# Patient Record
Sex: FEMALE | Race: WHITE | Employment: FULL TIME | URBAN - METROPOLITAN AREA
[De-identification: names, ages, dates, MRNs, and addresses within clinical notes are randomized per-mention and may not be internally consistent; named-entity substitution may affect disease eponyms.]

---

## 2017-06-12 ENCOUNTER — OFFICE VISIT (OUTPATIENT)
Dept: INTERNAL MEDICINE CLINIC | Age: 62
End: 2017-06-12

## 2017-06-12 VITALS
WEIGHT: 156.6 LBS | SYSTOLIC BLOOD PRESSURE: 112 MMHG | OXYGEN SATURATION: 98 % | BODY MASS INDEX: 23.73 KG/M2 | DIASTOLIC BLOOD PRESSURE: 70 MMHG | TEMPERATURE: 98.5 F | HEIGHT: 68 IN | RESPIRATION RATE: 16 BRPM | HEART RATE: 71 BPM

## 2017-06-12 DIAGNOSIS — Z71.84 TRAVEL ADVICE ENCOUNTER: ICD-10-CM

## 2017-06-12 DIAGNOSIS — E03.4 HYPOTHYROIDISM DUE TO ACQUIRED ATROPHY OF THYROID: ICD-10-CM

## 2017-06-12 DIAGNOSIS — Z00.00 WELL WOMAN EXAM (NO GYNECOLOGICAL EXAM): Primary | ICD-10-CM

## 2017-06-12 DIAGNOSIS — F41.9 ANXIETY: ICD-10-CM

## 2017-06-12 DIAGNOSIS — Z78.0 POSTMENOPAUSE: ICD-10-CM

## 2017-06-12 RX ORDER — LEVOTHYROXINE SODIUM 88 UG/1
TABLET ORAL
Qty: 30 TAB | Refills: 11 | Status: SHIPPED | OUTPATIENT
Start: 2017-06-12 | End: 2018-06-25 | Stop reason: SDUPTHER

## 2017-06-12 RX ORDER — ALPRAZOLAM 1 MG/1
1 TABLET ORAL
Qty: 25 TAB | Refills: 0 | Status: SHIPPED | OUTPATIENT
Start: 2017-06-12 | End: 2018-01-10 | Stop reason: SDUPTHER

## 2017-06-12 NOTE — MR AVS SNAPSHOT
Visit Information Date & Time Provider Department Dept. Phone Encounter #  
 6/12/2017 10:30 AM Ryan Womack MD [facility] [address] [id number] Upcoming Health Maintenance Date Due Hepatitis C Screening 1955 ZOSTER VACCINE AGE 60> 5/6/2016 INFLUENZA AGE 9 TO ADULT 8/1/2017 BREAST CANCER SCRN MAMMOGRAM 8/25/2018 PAP AKA CERVICAL CYTOLOGY 6/2/2019 DTaP/Tdap/Td series (2 - Td) 9/21/2019 COLONOSCOPY 3/15/2023 Allergies as of 6/12/2017  Review Complete On: 6/12/2017 By: Ryan Womack MD  
  
 Severity Noted Reaction Type Reactions Aspirin  05/26/2009    Anaphylaxis Azithromycin  12/11/2015    Hives Codeine  05/26/2009    Hives Coumadin [Warfarin]  05/26/2009    Rash Red Dye  04/07/2011   Systemic Swelling Red dye # 5 - lip swelling Sulfa (Sulfonamide Antibiotics)  05/26/2009    Hives Current Immunizations  Reviewed on 6/12/2017 Name Date TDAP Vaccine 9/21/2009  2:42 PM  
  
 Reviewed by Ryan Womack MD on 6/12/2017 at 10:51 AM  
You Were Diagnosed With   
  
 Codes Comments Well woman exam (no gynecological exam)    -  Primary ICD-10-CM: Z00.00 ICD-9-CM: V70.0 Hypothyroidism due to acquired atrophy of thyroid     ICD-10-CM: E03.4 ICD-9-CM: 244.8, 246.8 Postmenopause     ICD-10-CM: Z78.0 ICD-9-CM: V49.81 Anxiety     ICD-10-CM: F41.9 ICD-9-CM: 300.00 Travel advice encounter     ICD-10-CM: Z71.89 ICD-9-CM: V65.49 Vitals BP Pulse Temp Resp Height(growth percentile) Weight(growth percentile) 112/70 (BP 1 Location: Left arm, BP Patient Position: Sitting) 71 98.5 °F (36.9 °C) (Oral) 16 5' 8\" (1.727 m) 156 lb 9.6 oz (71 kg) LMP SpO2 BMI OB Status Smoking Status 02/05/2007 98% 23.81 kg/m2 Postmenopausal Never Smoker Vitals History BMI and BSA Data Body Mass Index Body Surface Area  
 23.81 kg/m 2 1.85 m 2 Preferred Pharmacy Pharmacy Name Phone [address], [address] [facility] [address] [phone] Your Updated Medication List  
  
   
This list is accurate as of: 6/12/17 11:04 AM.  Always use your most recent med list.  
  
  
  
  
 ALPRAZolam 1 mg tablet Commonly known as:  Syed Cellar Take 1 Tab by mouth nightly as needed for Anxiety. Max Daily Amount: 1 mg.  
  
 conjugated estrogens 0.625 mg/gram vaginal cream  
Commonly known as:  PREMARIN  
INSERT 1 GRAM VAGINALLY TWICE WEEKLY AS DIRECTED  
  
 fexofenadine 180 mg tablet Commonly known as:  Hoang Vicente Take 1 Tab by mouth daily for 360 days. SYNTHROID 88 mcg tablet Generic drug:  levothyroxine TAKE 1 TABLET BY MOUTH EVERY DAY Prescriptions Printed Refills ALPRAZolam (XANAX) 1 mg tablet 0 Sig: Take 1 Tab by mouth nightly as needed for Anxiety. Max Daily Amount: 1 mg. Class: Print Route: Oral  
  
Prescriptions Sent to Pharmacy Refills  
 conjugated estrogens (PREMARIN) 0.625 mg/gram vaginal cream 5 Sig: INSERT 1 GRAM VAGINALLY TWICE WEEKLY AS DIRECTED Class: Normal  
 Pharmacy: [address] Ph #: [phone] SYNTHROID 88 mcg tablet 11 Sig: TAKE 1 TABLET BY MOUTH EVERY DAY Class: Normal  
 Pharmacy: [address] [facility] Ph #: [phone] We Performed the Following CBC WITH AUTOMATED DIFF [88652 CPT(R)] HEPATITIS C AB [18625 CPT(R)] LIPID PANEL [90016 CPT(R)] METABOLIC PANEL, COMPREHENSIVE [09608 CPT(R)] T4, FREE [id number] CPT(R)] TSH 3RD GENERATION [07187 CPT(R)] VITAMIN D, 25 HYDROXY [id number] CPT(R)] [facility]! Dear Fabien Gan: 
Thank you for requesting a [employer] account.   Our records indicate that you already have an active Daily De[employer] account. You can access your account anytime at [email or web address] Did you know that you can access your hospital and ER discharge instructions at any time in Daily De[employer]? You can also review all of your test results from your hospital stay or ER visit. Additional Information If you have questions, please visit the Frequently Asked Questions section of the Daily De[employer] website at [email or web address] Remember, Daily [employer] is NOT to be used for urgent needs. For medical emergencies, dial 911. Now available from your iPhone and Android! Please provide this summary of care documentation to your next provider. Your primary care clinician is listed as Cece Sheppard. If you have any questions after today's visit, please call [phone].

## 2017-06-13 LAB
25(OH)D3+25(OH)D2 SERPL-MCNC: 36.4 NG/ML (ref 30–100)
ALBUMIN SERPL-MCNC: 4.1 G/DL (ref 3.6–4.8)
ALBUMIN/GLOB SERPL: 1.5 {RATIO} (ref 1.2–2.2)
ALP SERPL-CCNC: 95 IU/L (ref 39–117)
ALT SERPL-CCNC: 19 IU/L (ref 0–32)
AST SERPL-CCNC: 26 IU/L (ref 0–40)
BASOPHILS # BLD AUTO: 0 X10E3/UL (ref 0–0.2)
BASOPHILS NFR BLD AUTO: 0 %
BILIRUB SERPL-MCNC: 0.6 MG/DL (ref 0–1.2)
BUN SERPL-MCNC: 15 MG/DL (ref 8–27)
BUN/CREAT SERPL: 20 (ref 12–28)
CALCIUM SERPL-MCNC: 9.5 MG/DL (ref 8.7–10.3)
CHLORIDE SERPL-SCNC: 101 MMOL/L (ref 96–106)
CHOLEST SERPL-MCNC: 176 MG/DL (ref 100–199)
CO2 SERPL-SCNC: 24 MMOL/L (ref 18–29)
CREAT SERPL-MCNC: 0.76 MG/DL (ref 0.57–1)
EOSINOPHIL # BLD AUTO: 0.5 X10E3/UL (ref 0–0.4)
EOSINOPHIL NFR BLD AUTO: 8 %
ERYTHROCYTE [DISTWIDTH] IN BLOOD BY AUTOMATED COUNT: 13.2 % (ref 12.3–15.4)
GLOBULIN SER CALC-MCNC: 2.7 G/DL (ref 1.5–4.5)
GLUCOSE SERPL-MCNC: 93 MG/DL (ref 65–99)
HCT VFR BLD AUTO: 38.7 % (ref 34–46.6)
HCV AB S/CO SERPL IA: <0.1 S/CO RATIO (ref 0–0.9)
HDLC SERPL-MCNC: 58 MG/DL
HGB BLD-MCNC: 12.7 G/DL (ref 11.1–15.9)
IMM GRANULOCYTES # BLD: 0 X10E3/UL (ref 0–0.1)
IMM GRANULOCYTES NFR BLD: 0 %
INTERPRETATION, 910389: NORMAL
LDLC SERPL CALC-MCNC: 106 MG/DL (ref 0–99)
LYMPHOCYTES # BLD AUTO: 1.5 X10E3/UL (ref 0.7–3.1)
LYMPHOCYTES NFR BLD AUTO: 27 %
MCH RBC QN AUTO: 30.7 PG (ref 26.6–33)
MCHC RBC AUTO-ENTMCNC: 32.8 G/DL (ref 31.5–35.7)
MCV RBC AUTO: 94 FL (ref 79–97)
MONOCYTES # BLD AUTO: 0.5 X10E3/UL (ref 0.1–0.9)
MONOCYTES NFR BLD AUTO: 8 %
NEUTROPHILS # BLD AUTO: 3.1 X10E3/UL (ref 1.4–7)
NEUTROPHILS NFR BLD AUTO: 57 %
PLATELET # BLD AUTO: 230 X10E3/UL (ref 150–379)
POTASSIUM SERPL-SCNC: 4.1 MMOL/L (ref 3.5–5.2)
PROT SERPL-MCNC: 6.8 G/DL (ref 6–8.5)
RBC # BLD AUTO: 4.14 X10E6/UL (ref 3.77–5.28)
SODIUM SERPL-SCNC: 141 MMOL/L (ref 134–144)
T4 FREE SERPL-MCNC: 1.4 NG/DL (ref 0.82–1.77)
TRIGL SERPL-MCNC: 59 MG/DL (ref 0–149)
TSH SERPL DL<=0.005 MIU/L-ACNC: 1.91 UIU/ML (ref 0.45–4.5)
VLDLC SERPL CALC-MCNC: 12 MG/DL (ref 5–40)
WBC # BLD AUTO: 5.6 X10E3/UL (ref 3.4–10.8)

## 2017-08-01 ENCOUNTER — TELEPHONE (OUTPATIENT)
Dept: INTERNAL MEDICINE CLINIC | Age: 62
End: 2017-08-01

## 2018-01-10 DIAGNOSIS — F41.9 ANXIETY: ICD-10-CM

## 2018-01-10 RX ORDER — ALPRAZOLAM 1 MG/1
1 TABLET ORAL
Qty: 25 TAB | Refills: 0 | OUTPATIENT
Start: 2018-01-10 | End: 2018-06-21 | Stop reason: SDUPTHER

## 2018-01-10 NOTE — TELEPHONE ENCOUNTER
----- Message from Guanaco Hassan sent at 1/10/2018  9:23 AM EST -----  Regarding: Dr. Mayer   Patient is requesting a prescription for Xanax be sent to the [place] at [phone]. The patient's number is [phone].

## 2018-06-11 ENCOUNTER — OFFICE VISIT (OUTPATIENT)
Dept: INTERNAL MEDICINE CLINIC | Age: 63
End: 2018-06-11

## 2018-06-11 VITALS
TEMPERATURE: 97.7 F | HEIGHT: 68 IN | OXYGEN SATURATION: 98 % | DIASTOLIC BLOOD PRESSURE: 82 MMHG | BODY MASS INDEX: 22.73 KG/M2 | HEART RATE: 74 BPM | SYSTOLIC BLOOD PRESSURE: 126 MMHG | RESPIRATION RATE: 12 BRPM | WEIGHT: 150 LBS

## 2018-06-21 DIAGNOSIS — F41.9 ANXIETY: ICD-10-CM

## 2018-06-21 DIAGNOSIS — Z78.0 POSTMENOPAUSE: ICD-10-CM

## 2018-06-21 RX ORDER — ALPRAZOLAM 1 MG/1
TABLET ORAL
Qty: 25 TAB | Refills: 0 | OUTPATIENT
Start: 2018-06-21 | End: 2018-10-01 | Stop reason: SDUPTHER

## 2018-06-21 RX ORDER — CONJUGATED ESTROGENS 0.62 MG/G
CREAM VAGINAL
Qty: 30 G | Refills: 0 | Status: SHIPPED | OUTPATIENT
Start: 2018-06-21 | End: 2018-07-12 | Stop reason: SDUPTHER

## 2018-06-25 DIAGNOSIS — E03.4 HYPOTHYROIDISM DUE TO ACQUIRED ATROPHY OF THYROID: ICD-10-CM

## 2018-06-25 RX ORDER — LEVOTHYROXINE SODIUM 88 UG/1
TABLET ORAL
Qty: 30 TAB | Refills: 0 | Status: SHIPPED | OUTPATIENT
Start: 2018-06-25 | End: 2018-07-12 | Stop reason: SDUPTHER

## 2018-07-12 ENCOUNTER — OFFICE VISIT (OUTPATIENT)
Dept: INTERNAL MEDICINE CLINIC | Age: 63
End: 2018-07-12

## 2018-07-12 VITALS
WEIGHT: 150.2 LBS | HEIGHT: 68 IN | TEMPERATURE: 97.8 F | BODY MASS INDEX: 22.76 KG/M2 | HEART RATE: 67 BPM | RESPIRATION RATE: 12 BRPM | DIASTOLIC BLOOD PRESSURE: 71 MMHG | OXYGEN SATURATION: 98 % | SYSTOLIC BLOOD PRESSURE: 124 MMHG

## 2018-07-12 DIAGNOSIS — Z78.0 POSTMENOPAUSE: ICD-10-CM

## 2018-07-12 DIAGNOSIS — Z01.419 WELL WOMAN EXAM WITH ROUTINE GYNECOLOGICAL EXAM: Primary | ICD-10-CM

## 2018-07-12 DIAGNOSIS — E03.4 HYPOTHYROIDISM DUE TO ACQUIRED ATROPHY OF THYROID: ICD-10-CM

## 2018-07-12 DIAGNOSIS — M85.89 OSTEOPENIA OF MULTIPLE SITES: ICD-10-CM

## 2018-07-12 DIAGNOSIS — Z23 ENCOUNTER FOR IMMUNIZATION: ICD-10-CM

## 2018-07-12 RX ORDER — LEVOTHYROXINE SODIUM 88 UG/1
TABLET ORAL
Qty: 90 TAB | Refills: 1 | Status: SHIPPED | OUTPATIENT
Start: 2018-07-12 | End: 2019-01-13 | Stop reason: SDUPTHER

## 2018-07-12 NOTE — ASSESSMENT & PLAN NOTE
Lab Results   Component Value Date/Time    WBC 5.6 06/12/2017 11:39 AM    HGB 12.7 06/12/2017 11:39 AM    HCT 38.7 06/12/2017 11:39 AM    PLATELET 718 82/38/2007 11:39 AM    Creatinine 0.76 06/12/2017 11:39 AM    BUN 15 06/12/2017 11:39 AM    Potassium 4.1 06/12/2017 11:39 AM

## 2018-07-12 NOTE — MR AVS SNAPSHOT
[address] [facility], [place] 
 
 
 [address] [place] [address] [facility] 
[phone] Patient: Oumou Denton MRN: [id number] BPX:[birth year] Visit Information Date & Time Provider Department Dept. Phone Encounter #  
 7/12/2018  9:00 AM Silva Ortez NP [facility] [address] [id number] Upcoming Health Maintenance Date Due  
 BREAST CANCER SCRN MAMMOGRAM 8/25/2018 Influenza Age 5 to Adult 8/1/2018 PAP AKA CERVICAL CYTOLOGY 6/2/2019 DTaP/Tdap/Td series (2 - Td) 9/21/2019 COLONOSCOPY 3/15/2023 Allergies as of 7/12/2018  Review Complete On: 7/12/2018 By: Silva Ortez NP Severity Noted Reaction Type Reactions Aspirin  05/26/2009    Anaphylaxis Azithromycin  12/11/2015    Hives Codeine  05/26/2009    Hives Coumadin [Warfarin]  05/26/2009    Rash Red Dye  04/07/2011   Systemic Swelling Red dye # 5 - lip swelling Sulfa (Sulfonamide Antibiotics)  05/26/2009    Hives Current Immunizations  Reviewed on 6/12/2017 Name Date Pneumococcal Conjugate (PCV-13) 6/12/2017 TDAP Vaccine 9/21/2009  2:42 PM  
 Zoster Vaccine, Live 9/1/2016 Not reviewed this visit You Were Diagnosed With   
  
 Codes Comments Well woman exam with routine gynecological exam    -  Primary ICD-10-CM: W78.390 ICD-9-CM: V72.31 Postmenopause     ICD-10-CM: Z78.0 ICD-9-CM: V49.81 Osteopenia of multiple sites     ICD-10-CM: M85.89 ICD-9-CM: 733.90 Hypothyroidism due to acquired atrophy of thyroid     ICD-10-CM: E03.4 ICD-9-CM: 244.8, 246.8 Encounter for immunization     ICD-10-CM: D61 ICD-9-CM: V03.89 Vitals BP Pulse Temp Resp Height(growth percentile) Weight(growth percentile) 124/71 (BP 1 Location: Left arm, BP Patient Position: Sitting) 67 97.8 °F (36.6 °C) (Oral) 12 5' 8\" (1.727 m) 150 lb 3.2 oz (68.1 kg) LMP SpO2 BMI OB Status Smoking Status 2007 98% 22.84 kg/m2 Postmenopausal Never Smoker BMI and BSA Data Body Mass Index Body Surface Area  
 22.84 kg/m 2 1.81 m 2 Preferred Pharmacy Pharmacy Name Phone [address], [address] [facility] [address] Janelle Twan [phone] Your Updated Medication List  
  
   
This list is accurate as of 18  9:54 AM.  Always use your most recent med list.  
  
  
  
  
 ALPRAZolam 1 mg tablet Commonly known as:  XANAX  
TAKE 1 TABLET BY MOUTH EVERY EVENING AS NEEDED ANXIETY  
  
 conjugated estrogens 0.625 mg/gram vaginal cream  
Commonly known as:  PREMARIN  
INSERT 1 GRAM VAGINALLY 2 TIMES A WEEK AS DIRECTED  
  
 fexofenadine 180 mg tablet Commonly known as:  Lesly Chock Take 1 Tab by mouth daily for 360 days. SYNTHROID 88 mcg tablet Generic drug:  levothyroxine TAKE 1 TABLET BY MOUTH EVERY DAY  
  
 varicella-zoster recombinant (PF) 50 mcg/0.5 mL Susr injection Commonly known as:  SHINGRIX (PF)  
0.5 mL by IntraMUSCular route once for 1 dose. Repeat in 2-6 months with 2nd vaccine Prescriptions Printed Refills  
 varicella-zoster recombinant, PF, (SHINGRIX, PF,) 50 mcg/0.5 mL susr injection 1 Si.5 mL by IntraMUSCular route once for 1 dose. Repeat in 2-6 months with 2nd vaccine Class: Print Route: IntraMUSCular Prescriptions Sent to Pharmacy Refills  
 conjugated estrogens (PREMARIN) 0.625 mg/gram vaginal cream 3 Sig: INSERT 1 GRAM VAGINALLY 2 TIMES A WEEK AS DIRECTED Class: Normal  
 Pharmacy: [address] Ph #: [phone] SYNTHROID 88 mcg tablet 1 Sig: TAKE 1 TABLET BY MOUTH EVERY DAY Class: Normal  
 Pharmacy: [facility] [address] [place] [address] [address]la Ph #: [phone] We Performed the Following CBC WITH AUTOMATED DIFF [07891 CPT(R)] LIPID PANEL [82120 CPT(R)] METABOLIC PANEL, COMPREHENSIVE [37991 CPT(R)] OCCULT BLOOD, IMMUNOASSAY (FIT) [id number] CPT(R)] T4, FREE [id number] CPT(R)] TSH 3RD GENERATION [67855 CPT(R)] URINALYSIS W/ RFLX MICROSCOPIC [29853 CPT(R)] VITAMIN D, 25 HYDROXY [id number] CPT(R)] To-Do List   
 07/12/2018 Imaging:  DEXA BONE DENSITY STUDY AXIAL   
  
 07/12/2018 Imaging:  ROSANNA 3D SHAWANDA W MAMMO BI SCREENING INCL CAD Referral Information Referral ID Referred By Referred To  
  
 [id number] Kermit Stokes Not Available Visits Status Start Date End Date 1 New Request 7/12/18 7/12/19 If your referral has a status of pending review or denied, additional information will be sent to support the outcome of this decision. Patient Instructions Well Visit, Women 48 to 72: Care Instructions Your Care Instructions Physical exams can help you stay healthy. Your doctor has checked your overall health and may have suggested ways to take good care of yourself. He or she also may have recommended tests. At home, you can help prevent illness with healthy eating, regular exercise, and other steps. Follow-up care is a key part of your treatment and safety. Be sure to make and go to all appointments, and call your doctor if you are having problems. It's also a good idea to know your test results and keep a list of the medicines you take. How can you care for yourself at home? · Reach and stay at a healthy weight. This will lower your risk for many problems, such as obesity, diabetes, heart disease, and high blood pressure. · Get at least 30 minutes of exercise on most days of the week. Walking is a good choice. You also may want to do other activities, such as running, swimming, cycling, or playing tennis or team sports. · Do not smoke. Smoking can make health problems worse.  If you need help quitting, talk to your doctor about stop-smoking programs and medicines. These can increase your chances of quitting for good. · Protect your skin from too much sun. When you're outdoors from 10 a.m. to 4 p.m., stay in the shade or cover up with clothing and a hat with a wide brim. Wear sunglasses that block UV rays. Even when it's cloudy, put broad-spectrum sunscreen (SPF 30 or higher) on any exposed skin. · See a dentist one or two times a year for checkups and to have your teeth cleaned. · Wear a seat belt in the car. · Limit alcohol to 1 drink a day. Too much alcohol can cause health problems. Follow your doctor's advice about when to have certain tests. These tests can spot problems early. · Cholesterol. Your doctor will tell you how often to have this done based on your age, family history, or other things that can increase your risk for heart attack and stroke. · Blood pressure. Have your blood pressure checked during a routine doctor visit. Your doctor will tell you how often to check your blood pressure based on your age, your blood pressure results, and other factors. · Mammogram. Ask your doctor how often you should have a mammogram, which is an X-ray of your breasts. A mammogram can spot breast cancer before it can be felt and when it is easiest to treat. · Pap test and pelvic exam. Ask your doctor how often you should have a Pap test. You may not need to have a Pap test as often as you used to. · Vision. Have your eyes checked every year or two or as often as your doctor suggests. Some experts recommend that you have yearly exams for glaucoma and other age-related eye problems starting at age 48. · Hearing. Tell your doctor if you notice any change in your hearing. You can have tests to find out how well you hear. · Diabetes. Ask your doctor whether you should have tests for diabetes. · Colon cancer. You should begin tests for colon cancer at age 48.  You may have one of several tests. Your doctor will tell you how often to have tests based on your age and risk. Risks include whether you already had a precancerous polyp removed from your colon or whether your parents, sisters and brothers, or children have had colon cancer. · Thyroid disease. Talk to your doctor about whether to have your thyroid checked as part of a regular physical exam. Women have an increased chance of a thyroid problem. · Osteoporosis. You should begin tests for bone density at age 72. If you are younger than 72, ask your doctor whether you have factors that may increase your risk for this disease. You may want to have this test before age 72. · Heart attack and stroke risk. At least every 4 to 6 years, you should have your risk for heart attack and stroke assessed. Your doctor uses factors such as your age, blood pressure, cholesterol, and whether you smoke or have diabetes to show what your risk for a heart attack or stroke is over the next 10 years. When should you call for help? Watch closely for changes in your health, and be sure to contact your doctor if you have any problems or symptoms that concern you. Where can you learn more? Go to http://ewa-adam.info/. Enter C979 in the search box to learn more about \"Well Visit, Women 50 to 72: Care Instructions. \" Current as of: May 16, 2017 Content Version: 11.7 © 8338-9333 [employer]. Care instructions adapted under license by [employer] (which disclaims liability or warranty for this information). If you have questions about a medical condition or this instruction, always ask your healthcare professional. [place] [address] any warranty or liability for your use of this information. Introducing [facility] & HEALTH SERVICES! Dear Ladan Chris: 
Thank you for requesting a ugichem account. Our records indicate that you already have an active ugichem account.   You can access your account anytime at [email or web address] Did you know that you can access your hospital and ER discharge instructions at any time in [employer]? You can also review all of your test results from your hospital stay or ER visit. Additional Information If you have questions, please visit the Frequently Asked Questions section of the [employer] website at [email or web address] Remember, [employer] is NOT to be used for urgent needs. For medical emergencies, dial 911. Now available from your iPhone and Android! Please provide this summary of care documentation to your next provider. Your primary care clinician is listed as Cece 68. If you have any questions after today's visit, please call [phone].

## 2018-07-12 NOTE — PROGRESS NOTES
HISTORY OF PRESENT ILLNESS  Oumou Denton is a 59 y.o. female. HPI  Oumou Denton is here for complete health maintenance physical exam and screening. she does have other concerns. Pap/pelvic exam not indicated at this time according to current screening guidelines    Health maintenance hx includes:  Exercise: very active. Form of exercise: plays tennis 5 days per week, walking, cycling, swimming   Diet: generally follows a low fat low cholesterol diet; has been trying to cut down on her sugar intake    Cancer screening:    Colon cancer screening:  Last Colonoscopy: 2013 and   was normal   Breast cancer screening: last mammogram 2016 and   was normal   Cervical cancer screening: last PAP/Pelvic exam: 2016   and was normal.   Osteoporosis screening:  Last BMD:  2014 and revealed - Osteopenia     SUBJECTIVE: Oumou Denton is a 59 y.o. female here for follow up of hypothyroidism. Lab Results   Component Value Date/Time    TSH 1.910 06/12/2017 11:39 AM     Thyroid ROS: denies fatigue, weight changes, heat/cold intolerance, bowel/skin changes or CVS symptoms. She is due to have her levels checked. Bone mineral density in 2014 showed osteopenia; she eats dairy products for calcium but has not been taking any Vitamin D supplements; spends time in sun. Takes Xanax sporadically when she is unable to sleep and was recently filled June 2018.       Lab Results   Component Value Date/Time    Cholesterol, total 176 06/12/2017 11:39 AM    HDL Cholesterol 58 06/12/2017 11:39 AM    LDL, calculated 106 (H) 06/12/2017 11:39 AM    VLDL, calculated 12 06/12/2017 11:39 AM    Triglyceride 59 06/12/2017 11:39 AM    CHOL/HDL Ratio 3.2 10/08/2010 01:04 PM         Lab Results   Component Value Date/Time    Glucose 93 06/12/2017 11:39 AM       Immunizations:     Immunization History   Administered Date(s) Administered    Pneumococcal Conjugate (PCV-13) 06/12/2017    TDAP Vaccine 09/21/2009    Zoster Vaccine, Live 09/01/2016 Immunization status: given printed Rx for Shingrix vaccine. Social History     Social History    Marital status:      Spouse name: N/A    Number of children: N/A    Years of education: N/A     Occupational History    Not on file. Social History Main Topics    Smoking status: Never Smoker    Smokeless tobacco: Never Used    Alcohol use 0.5 oz/week     1 Cans of beer per week      Comment: rarely    Drug use: No    Sexual activity: Yes     Partners: Male     Other Topics Concern    Not on file     Social History Narrative     Past Surgical History:   Procedure Laterality Date    HX COLONOSCOPY  3/2013    HX HEENT      wisdom teeth extraction    HX ORTHOPAEDIC  1/2016    right 3rd finger trigger release    HX TONSILLECTOMY      HX TUBAL LIGATION       Family History   Problem Relation Age of Onset    Cancer Mother      breast ca 61, copd age 67    Lung Disease Mother     Heart Failure Father      mi age 62    Heart Disease Father     Hypertension Father     Cancer Sister      breast ca 50    Thyroid Disease Sister     Thyroid Disease Brother     Cancer Sister      thyroid cancer    Diabetes Sister      Type 1 DM    No Known Problems Son     No Known Problems Daughter     No Known Problems Son      Current Outpatient Prescriptions on File Prior to Visit   Medication Sig Dispense Refill    ALPRAZolam (XANAX) 1 mg tablet TAKE 1 TABLET BY MOUTH EVERY EVENING AS NEEDED ANXIETY 25 Tab 0    fexofenadine (ALLEGRA) 180 mg tablet Take 1 Tab by mouth daily for 360 days. (Patient taking differently: Take 180 mg by mouth as needed.) 30 Tab 5     No current facility-administered medications on file prior to visit. .  Review of Systems   Constitutional: Negative for chills, fever and malaise/fatigue. HENT: Negative for congestion, ear pain, sinus pain and sore throat. Eyes: Negative for blurred vision, double vision and photophobia.    Respiratory: Negative for cough and shortness of breath. Cardiovascular: Negative for chest pain, palpitations and leg swelling. Gastrointestinal: Negative for abdominal pain, blood in stool, constipation, diarrhea, nausea and vomiting. Genitourinary: Negative for dysuria, frequency, hematuria and urgency. Musculoskeletal: Negative for joint pain and myalgias. Skin: Negative for rash. Neurological: Negative for dizziness and headaches. Psychiatric/Behavioral: Negative for depression. The patient has insomnia (occasionally when traveling). /71 (BP 1 Location: Left arm, BP Patient Position: Sitting)  Pulse 67  Temp 97.8 °F (36.6 °C) (Oral)   Resp 12  Ht 5' 8\" (1.727 m)  Wt 150 lb 3.2 oz (68.1 kg)  LMP 02/05/2007  SpO2 98%  BMI 22.84 kg/m2  Physical Exam   Constitutional: She is oriented to person, place, and time. She appears well-developed and well-nourished. HENT:   Head: Normocephalic and atraumatic. Right Ear: External ear normal.   Left Ear: External ear normal.   Nose: Nose normal.   Mouth/Throat: Oropharynx is clear and moist.   Eyes: Conjunctivae are normal. Pupils are equal, round, and reactive to light. Neck: Normal range of motion. Neck supple. No thyromegaly present. Cardiovascular: Normal rate, regular rhythm and normal heart sounds. Pulmonary/Chest: Effort normal and breath sounds normal. She has no wheezes. She has no rales. Right breast exhibits no inverted nipple, no mass, no nipple discharge, no skin change and no tenderness. Left breast exhibits no inverted nipple, no mass, no nipple discharge, no skin change and no tenderness. Breasts are symmetrical.   Abdominal: Soft. Bowel sounds are normal. There is no tenderness. There is no rebound and no guarding. Musculoskeletal: Normal range of motion. She exhibits no edema. Lymphadenopathy:     She has no cervical adenopathy. Neurological: She is alert and oriented to person, place, and time. Skin: Skin is warm and dry. No rash noted. Psychiatric: She has a normal mood and affect. Her behavior is normal.   Nursing note and vitals reviewed. ASSESSMENT and PLAN  Diagnoses and all orders for this visit:    1. Well woman exam with routine gynecological exam  -     ROSANNA 3D SHAWANDA W MAMMO BI SCREENING INCL CAD; Future  -     OCCULT BLOOD, IMMUNOASSAY (FIT)  -     CBC WITH AUTOMATED DIFF  -     LIPID PANEL  -     METABOLIC PANEL, COMPREHENSIVE  -     URINALYSIS W/ RFLX MICROSCOPIC    2. Postmenopause  -     conjugated estrogens (PREMARIN) 0.625 mg/gram vaginal cream; INSERT 1 GRAM VAGINALLY 2 TIMES A WEEK AS DIRECTED  -     VITAMIN D, 25 HYDROXY  -     DEXA BONE DENSITY STUDY AXIAL; Future    3. Osteopenia of multiple sites  -     VITAMIN D, 25 HYDROXY  -     DEXA BONE DENSITY STUDY AXIAL; Future    4. Hypothyroidism due to acquired atrophy of thyroid  -     SYNTHROID 88 mcg tablet; TAKE 1 TABLET BY MOUTH EVERY DAY  -     T4, FREE  -     TSH 3RD GENERATION    5. Encounter for immunization  -     varicella-zoster recombinant, PF, (SHINGRIX, PF,) 50 mcg/0.5 mL susr injection; 0.5 mL by IntraMUSCular route once for 1 dose. Repeat in 2-6 months with 2nd vaccine        Alda Screen was counseled on age-appropriate/ guideline-based risk prevention behaviors and screening for a 59y.o. year old   female . We also discussed adjustments in screening based on family history if necessary. Printed instructions for preventative screening guidelines and healthy behaviors given to patient with after visit summary.         lab results and schedule of future lab studies reviewed with patient  reviewed diet, exercise and weight control  cardiovascular risk and specific lipid/LDL goals reviewed  reviewed medications and side effects in detail

## 2018-07-12 NOTE — PATIENT INSTRUCTIONS
Well Visit, Women 48 to 72: Care Instructions  Your Care Instructions    Physical exams can help you stay healthy. Your doctor has checked your overall health and may have suggested ways to take good care of yourself. He or she also may have recommended tests. At home, you can help prevent illness with healthy eating, regular exercise, and other steps. Follow-up care is a key part of your treatment and safety. Be sure to make and go to all appointments, and call your doctor if you are having problems. It's also a good idea to know your test results and keep a list of the medicines you take. How can you care for yourself at home? · Reach and stay at a healthy weight. This will lower your risk for many problems, such as obesity, diabetes, heart disease, and high blood pressure. · Get at least 30 minutes of exercise on most days of the week. Walking is a good choice. You also may want to do other activities, such as running, swimming, cycling, or playing tennis or team sports. · Do not smoke. Smoking can make health problems worse. If you need help quitting, talk to your doctor about stop-smoking programs and medicines. These can increase your chances of quitting for good. · Protect your skin from too much sun. When you're outdoors from 10 a.m. to 4 p.m., stay in the shade or cover up with clothing and a hat with a wide brim. Wear sunglasses that block UV rays. Even when it's cloudy, put broad-spectrum sunscreen (SPF 30 or higher) on any exposed skin. · See a dentist one or two times a year for checkups and to have your teeth cleaned. · Wear a seat belt in the car. · Limit alcohol to 1 drink a day. Too much alcohol can cause health problems. Follow your doctor's advice about when to have certain tests. These tests can spot problems early. · Cholesterol.  Your doctor will tell you how often to have this done based on your age, family history, or other things that can increase your risk for heart attack and stroke. · Blood pressure. Have your blood pressure checked during a routine doctor visit. Your doctor will tell you how often to check your blood pressure based on your age, your blood pressure results, and other factors. · Mammogram. Ask your doctor how often you should have a mammogram, which is an X-ray of your breasts. A mammogram can spot breast cancer before it can be felt and when it is easiest to treat. · Pap test and pelvic exam. Ask your doctor how often you should have a Pap test. You may not need to have a Pap test as often as you used to. · Vision. Have your eyes checked every year or two or as often as your doctor suggests. Some experts recommend that you have yearly exams for glaucoma and other age-related eye problems starting at age 48. · Hearing. Tell your doctor if you notice any change in your hearing. You can have tests to find out how well you hear. · Diabetes. Ask your doctor whether you should have tests for diabetes. · Colon cancer. You should begin tests for colon cancer at age 48. You may have one of several tests. Your doctor will tell you how often to have tests based on your age and risk. Risks include whether you already had a precancerous polyp removed from your colon or whether your parents, sisters and brothers, or children have had colon cancer. · Thyroid disease. Talk to your doctor about whether to have your thyroid checked as part of a regular physical exam. Women have an increased chance of a thyroid problem. · Osteoporosis. You should begin tests for bone density at age 72. If you are younger than 72, ask your doctor whether you have factors that may increase your risk for this disease. You may want to have this test before age 72. · Heart attack and stroke risk. At least every 4 to 6 years, you should have your risk for heart attack and stroke assessed.  Your doctor uses factors such as your age, blood pressure, cholesterol, and whether you smoke or have diabetes to show what your risk for a heart attack or stroke is over the next 10 years. When should you call for help? Watch closely for changes in your health, and be sure to contact your doctor if you have any problems or symptoms that concern you. Where can you learn more? Go to http://ewa-adam.info/. Enter B205 in the search box to learn more about \"Well Visit, Women 50 to 72: Care Instructions. \"  Current as of: May 16, 2017  Content Version: 11.7  © 1299-7343 [employer]. Care instructions adapted under license by [employer] (which disclaims liability or warranty for this information). If you have questions about a medical condition or this instruction, always ask your healthcare professional. [address] any warranty or liability for your use of this information.

## 2018-07-13 LAB
25(OH)D3+25(OH)D2 SERPL-MCNC: 40.5 NG/ML (ref 30–100)
ALBUMIN SERPL-MCNC: 4.1 G/DL (ref 3.6–4.8)
ALBUMIN/GLOB SERPL: 1.5 {RATIO} (ref 1.2–2.2)
ALP SERPL-CCNC: 109 IU/L (ref 39–117)
ALT SERPL-CCNC: 21 IU/L (ref 0–32)
APPEARANCE UR: CLEAR
AST SERPL-CCNC: 30 IU/L (ref 0–40)
BASOPHILS # BLD AUTO: 0 X10E3/UL (ref 0–0.2)
BASOPHILS NFR BLD AUTO: 1 %
BILIRUB SERPL-MCNC: 0.9 MG/DL (ref 0–1.2)
BILIRUB UR QL STRIP: NEGATIVE
BUN SERPL-MCNC: 16 MG/DL (ref 8–27)
BUN/CREAT SERPL: 20 (ref 12–28)
CALCIUM SERPL-MCNC: 9.6 MG/DL (ref 8.7–10.3)
CHLORIDE SERPL-SCNC: 102 MMOL/L (ref 96–106)
CHOLEST SERPL-MCNC: 175 MG/DL (ref 100–199)
CO2 SERPL-SCNC: 24 MMOL/L (ref 20–29)
COLOR UR: YELLOW
CREAT SERPL-MCNC: 0.82 MG/DL (ref 0.57–1)
EOSINOPHIL # BLD AUTO: 0.2 X10E3/UL (ref 0–0.4)
EOSINOPHIL NFR BLD AUTO: 5 %
ERYTHROCYTE [DISTWIDTH] IN BLOOD BY AUTOMATED COUNT: 13 % (ref 12.3–15.4)
GLOBULIN SER CALC-MCNC: 2.7 G/DL (ref 1.5–4.5)
GLUCOSE SERPL-MCNC: 90 MG/DL (ref 65–99)
GLUCOSE UR QL: NEGATIVE
HCT VFR BLD AUTO: 37.9 % (ref 34–46.6)
HDLC SERPL-MCNC: 59 MG/DL
HGB BLD-MCNC: 12.6 G/DL (ref 11.1–15.9)
HGB UR QL STRIP: NEGATIVE
IMM GRANULOCYTES # BLD: 0 X10E3/UL (ref 0–0.1)
IMM GRANULOCYTES NFR BLD: 0 %
INTERPRETATION, 910389: NORMAL
KETONES UR QL STRIP: NEGATIVE
LDLC SERPL CALC-MCNC: 106 MG/DL (ref 0–99)
LEUKOCYTE ESTERASE UR QL STRIP: NEGATIVE
LYMPHOCYTES # BLD AUTO: 1.3 X10E3/UL (ref 0.7–3.1)
LYMPHOCYTES NFR BLD AUTO: 30 %
MCH RBC QN AUTO: 30.9 PG (ref 26.6–33)
MCHC RBC AUTO-ENTMCNC: 33.2 G/DL (ref 31.5–35.7)
MCV RBC AUTO: 93 FL (ref 79–97)
MICRO URNS: NORMAL
MONOCYTES # BLD AUTO: 0.4 X10E3/UL (ref 0.1–0.9)
MONOCYTES NFR BLD AUTO: 8 %
NEUTROPHILS # BLD AUTO: 2.5 X10E3/UL (ref 1.4–7)
NEUTROPHILS NFR BLD AUTO: 56 %
NITRITE UR QL STRIP: NEGATIVE
PH UR STRIP: 6 [PH] (ref 5–7.5)
PLATELET # BLD AUTO: 252 X10E3/UL (ref 150–379)
POTASSIUM SERPL-SCNC: 4.6 MMOL/L (ref 3.5–5.2)
PROT SERPL-MCNC: 6.8 G/DL (ref 6–8.5)
PROT UR QL STRIP: NEGATIVE
RBC # BLD AUTO: 4.08 X10E6/UL (ref 3.77–5.28)
SODIUM SERPL-SCNC: 140 MMOL/L (ref 134–144)
SP GR UR: 1.01 (ref 1–1.03)
T4 FREE SERPL-MCNC: 1.45 NG/DL (ref 0.82–1.77)
TRIGL SERPL-MCNC: 48 MG/DL (ref 0–149)
TSH SERPL DL<=0.005 MIU/L-ACNC: 2.14 UIU/ML (ref 0.45–4.5)
UROBILINOGEN UR STRIP-MCNC: 0.2 MG/DL (ref 0.2–1)
VLDLC SERPL CALC-MCNC: 10 MG/DL (ref 5–40)
WBC # BLD AUTO: 4.4 X10E3/UL (ref 3.4–10.8)

## 2018-08-13 ENCOUNTER — HOSPITAL ENCOUNTER (OUTPATIENT)
Dept: MAMMOGRAPHY | Age: 63
Discharge: HOME | End: 2018-08-13
Attending: NURSE PRACTITIONER
Payer: COMMERCIAL

## 2018-08-13 DIAGNOSIS — Z78.0 POSTMENOPAUSE: ICD-10-CM

## 2018-08-13 DIAGNOSIS — M85.89 OSTEOPENIA OF MULTIPLE SITES: ICD-10-CM

## 2018-08-13 PROCEDURE — 77080 DXA BONE DENSITY AXIAL: CPT

## 2018-10-01 DIAGNOSIS — F41.9 ANXIETY: ICD-10-CM

## 2018-10-01 RX ORDER — ALPRAZOLAM 1 MG/1
TABLET ORAL
Qty: 25 TAB | Refills: 0 | OUTPATIENT
Start: 2018-10-01 | End: 2018-12-27 | Stop reason: SDUPTHER

## 2019-01-13 DIAGNOSIS — E03.4 HYPOTHYROIDISM DUE TO ACQUIRED ATROPHY OF THYROID: ICD-10-CM

## 2019-01-13 RX ORDER — LEVOTHYROXINE SODIUM 88 UG/1
TABLET ORAL
Qty: 90 TAB | Refills: 0 | Status: SHIPPED | OUTPATIENT
Start: 2019-01-13 | End: 2019-02-11 | Stop reason: SDUPTHER

## 2019-02-11 ENCOUNTER — OFFICE VISIT (OUTPATIENT)
Dept: INTERNAL MEDICINE CLINIC | Age: 64
End: 2019-02-11

## 2019-02-11 VITALS
OXYGEN SATURATION: 99 % | BODY MASS INDEX: 22.58 KG/M2 | TEMPERATURE: 98 F | WEIGHT: 149 LBS | RESPIRATION RATE: 16 BRPM | HEIGHT: 68 IN | HEART RATE: 72 BPM | SYSTOLIC BLOOD PRESSURE: 107 MMHG | DIASTOLIC BLOOD PRESSURE: 70 MMHG

## 2019-02-11 DIAGNOSIS — E03.4 HYPOTHYROIDISM DUE TO ACQUIRED ATROPHY OF THYROID: ICD-10-CM

## 2019-02-11 DIAGNOSIS — F41.9 ANXIETY: ICD-10-CM

## 2019-02-11 DIAGNOSIS — E03.9 ACQUIRED HYPOTHYROIDISM: Primary | ICD-10-CM

## 2019-02-11 RX ORDER — LEVOTHYROXINE SODIUM 88 UG/1
TABLET ORAL
Qty: 90 TAB | Refills: 2 | Status: SHIPPED | OUTPATIENT
Start: 2019-02-11 | End: 2020-01-03

## 2019-02-11 RX ORDER — ALPRAZOLAM 1 MG/1
TABLET ORAL
Qty: 25 TAB | Refills: 2 | Status: SHIPPED | OUTPATIENT
Start: 2019-02-11 | End: 2019-07-30 | Stop reason: SDUPTHER

## 2019-02-11 NOTE — PROGRESS NOTES
HISTORY OF PRESENT ILLNESS  Indira Washburn is a 59 y.o. female. HPI  Follow up, med check. Issues:  1. Insomnia. Uses Xanax 1 mg prn, 25 pills the end of December. She is almost out of pills now. Discussed not to use it every night. Did refill for three months. 2. Hypothyroidism, on 88 mcg dose. No symptoms of hypo or hyperthyroidism. Due for levels. 3. GYN care is due this June and she will set up. Son Francesco Eric she anticipates getting engaged soon, he is 28. Daughter Renuka Jung also anticipates engagement this year. Review of Systems   Constitutional: Negative for chills, fever, malaise/fatigue and weight loss. Respiratory: Negative for cough, shortness of breath and wheezing. Cardiovascular: Negative for chest pain, palpitations, orthopnea, leg swelling and PND. Gastrointestinal: Negative for constipation, diarrhea, heartburn and nausea. Musculoskeletal: Negative for myalgias. Neurological: Negative for dizziness and headaches. Psychiatric/Behavioral: The patient has insomnia. The patient is not nervous/anxious. Physical Exam   Constitutional: She is oriented to person, place, and time. She appears well-developed and well-nourished. HENT:   Head: Normocephalic and atraumatic. Neck: Normal range of motion. Neck supple. Carotid bruit is not present. No thyromegaly present. Cardiovascular: Normal rate, regular rhythm, S1 normal, S2 normal, normal heart sounds and intact distal pulses. No murmur heard. Pulmonary/Chest: Effort normal and breath sounds normal. No respiratory distress. She has no wheezes. She has no rales. Musculoskeletal: She exhibits no edema. Neurological: She is alert and oriented to person, place, and time. Psychiatric: She has a normal mood and affect. Her behavior is normal.   Nursing note and vitals reviewed. ASSESSMENT and PLAN  Diagnoses and all orders for this visit:    1.  Acquired hypothyroidism-cont 88 mcg dose  -     TSH 3RD GENERATION  - T4, FREE    2.  Anxiety  -     ALPRAZolam (XANAX) 1 mg tablet; TAKE 1 TABLET BY MOUTH EVERY EVENING AS NEEDED

## 2019-02-12 LAB
T4 FREE SERPL-MCNC: 1.42 NG/DL (ref 0.82–1.77)
TSH SERPL DL<=0.005 MIU/L-ACNC: 3.44 UIU/ML (ref 0.45–4.5)

## 2019-06-11 ENCOUNTER — OFFICE VISIT (OUTPATIENT)
Dept: INTERNAL MEDICINE CLINIC | Age: 64
End: 2019-06-11

## 2019-06-11 VITALS
HEART RATE: 68 BPM | WEIGHT: 150 LBS | HEIGHT: 68 IN | BODY MASS INDEX: 22.73 KG/M2 | TEMPERATURE: 97.8 F | SYSTOLIC BLOOD PRESSURE: 91 MMHG | RESPIRATION RATE: 16 BRPM | DIASTOLIC BLOOD PRESSURE: 62 MMHG | OXYGEN SATURATION: 98 %

## 2019-06-11 DIAGNOSIS — E03.9 ACQUIRED HYPOTHYROIDISM: ICD-10-CM

## 2019-06-11 DIAGNOSIS — Z01.419 WELL WOMAN EXAM WITH ROUTINE GYNECOLOGICAL EXAM: Primary | ICD-10-CM

## 2019-06-11 DIAGNOSIS — Z78.0 POSTMENOPAUSE: ICD-10-CM

## 2019-06-11 DIAGNOSIS — Z12.11 COLON CANCER SCREENING: ICD-10-CM

## 2019-06-11 RX ORDER — NEOMYCIN SULFATE, POLYMYXIN B SULFATE, AND DEXAMETHASONE 3.5; 10000; 1 MG/G; [USP'U]/G; MG/G
OINTMENT OPHTHALMIC
COMMUNITY
Start: 2019-05-21 | End: 2019-10-08 | Stop reason: ALTCHOICE

## 2019-06-11 NOTE — PROGRESS NOTES
Subjective:   62 y.o. female for Well Woman Check. Patient's last menstrual period was 02/05/2007. Social History: single partner, contraception - post menopausal status. Pertinent past medical hstory: colonoscopsy 2001  dexa 2018  mammo 2018  Son is getting  next spring  She feels well on synthroid 88 due for levels. Patient Active Problem List    Diagnosis Date Noted    Osteopenia of multiple sites 07/12/2018    Family history of breast cancer 09/21/2009    S/P colonoscopy 09/21/2009    Hypothyroid 05/26/2009    Fibromyalgia 05/26/2009    DVT 05/26/2009     Current Outpatient Medications   Medication Sig Dispense Refill    conjugated estrogens (PREMARIN) 0.625 mg/gram vaginal cream INSERT 1 GRAM VAGINALLY 2 TIMES A WEEK AS DIRECTED 90 g 3    ALPRAZolam (XANAX) 1 mg tablet TAKE 1 TABLET BY MOUTH EVERY EVENING AS NEEDED 25 Tab 2    SYNTHROID 88 mcg tablet 1 po qam 90 Tab 2    fexofenadine (ALLEGRA) 180 mg tablet Take 1 Tab by mouth daily for 360 days.  (Patient taking differently: Take 180 mg by mouth as needed.) 30 Tab 5    neomycin-polymyxin-dexamethasone (DEXACINE) 3.5 mg/g-10,000 unit/g-0.1 % ophthalmic ointment        Allergies   Allergen Reactions    Penicillins Hives    Aspirin Anaphylaxis    Azithromycin Hives    Codeine Hives    Coumadin [Warfarin] Rash    Red Dye Swelling     Red dye # 5 - lip swelling     Sulfa (Sulfonamide Antibiotics) Hives     Past Medical History:   Diagnosis Date    Family history of breast cancer 9/21/2009    Hypothyroid 5/26/2009    S/P colonoscopy 9/21/2009     Past Surgical History:   Procedure Laterality Date    HX COLONOSCOPY  3/2013    HX HEENT      wisdom teeth extraction    HX ORTHOPAEDIC  1/2016    right 3rd finger trigger release    HX TONSILLECTOMY      HX TUBAL LIGATION       Family History   Problem Relation Age of Onset    Cancer Mother         breast ca 61, copd age 67    Lung Disease Mother     Heart Failure Father mi age 62    Heart Disease Father     Hypertension Father     Cancer Sister         breast ca 50    Thyroid Disease Sister     Thyroid Disease Brother     Cancer Sister         thyroid cancer    Diabetes Sister         Type 1 DM    No Known Problems Son     No Known Problems Daughter     No Known Problems Son      Social History     Tobacco Use    Smoking status: Never Smoker    Smokeless tobacco: Never Used   Substance Use Topics    Alcohol use: Yes     Alcohol/week: 0.5 oz     Types: 1 Cans of beer per week     Comment: rarely        ROS:  Feeling well. No dyspnea or chest pain on exertion. No abdominal pain, change in bowel habits, black or bloody stools. No urinary tract symptoms. GYN ROS: no breast pain or new or enlarging lumps on self exam, no vaginal bleeding, no discharge or pelvic pain. No neurological complaints. Objective:     Visit Vitals  BP 91/62 (BP 1 Location: Left arm, BP Patient Position: Sitting)   Pulse 68   Temp 97.8 °F (36.6 °C) (Oral)   Resp 16   Ht 5' 8\" (1.727 m)   Wt 150 lb (68 kg)   LMP 02/05/2007   SpO2 98%   BMI 22.81 kg/m²     The patient appears well, alert, oriented x 3, in no distress. ENT normal.  Neck supple. No adenopathy or thyromegaly. SHABBIR. Lungs are clear, good air entry, no wheezes, rhonchi or rales. S1 and S2 normal, no murmurs, regular rate and rhythm. Abdomen soft without tenderness, guarding, mass or organomegaly. Extremities show no edema, normal peripheral pulses. Neurological is normal, no focal findings. BREAST EXAM: breasts appear normal, no suspicious masses, no skin or nipple changes or axillary nodes    PELVIC EXAM: normal external genitalia, vulva, vagina, cervix, uterus and adnexa, PAP: Pap smear done today, thin-prep method    Assessment/Plan:   well woman  mammogram  pap smear  Diagnoses and all orders for this visit:    1.  Well woman exam with routine gynecological exam  -     METABOLIC PANEL, COMPREHENSIVE  -     LIPID PANEL  - CBC WITH AUTOMATED DIFF    2. Postmenopause  -     conjugated estrogens (PREMARIN) 0.625 mg/gram vaginal cream; INSERT 1 GRAM VAGINALLY 2 TIMES A WEEK AS DIRECTED  -     VITAMIN D, 25 HYDROXY    3. Acquired hypothyroidism  -     TSH 3RD GENERATION  -     T4, FREE    4. Colon cancer screening  -     COLOGUARD TEST (FECAL DNA COLORECTAL CANCER SCREENING)    .

## 2019-06-12 LAB
25(OH)D3+25(OH)D2 SERPL-MCNC: 43.5 NG/ML (ref 30–100)
ALBUMIN SERPL-MCNC: 4.1 G/DL (ref 3.6–4.8)
ALBUMIN/GLOB SERPL: 1.6 {RATIO} (ref 1.2–2.2)
ALP SERPL-CCNC: 109 IU/L (ref 39–117)
ALT SERPL-CCNC: 23 IU/L (ref 0–32)
AST SERPL-CCNC: 30 IU/L (ref 0–40)
BASOPHILS # BLD AUTO: 0 X10E3/UL (ref 0–0.2)
BASOPHILS NFR BLD AUTO: 1 %
BILIRUB SERPL-MCNC: 0.8 MG/DL (ref 0–1.2)
BUN SERPL-MCNC: 11 MG/DL (ref 8–27)
BUN/CREAT SERPL: 14 (ref 12–28)
CALCIUM SERPL-MCNC: 9.6 MG/DL (ref 8.7–10.3)
CHLORIDE SERPL-SCNC: 104 MMOL/L (ref 96–106)
CHOLEST SERPL-MCNC: 167 MG/DL (ref 100–199)
CO2 SERPL-SCNC: 24 MMOL/L (ref 20–29)
CREAT SERPL-MCNC: 0.81 MG/DL (ref 0.57–1)
EOSINOPHIL # BLD AUTO: 0.4 X10E3/UL (ref 0–0.4)
EOSINOPHIL NFR BLD AUTO: 10 %
ERYTHROCYTE [DISTWIDTH] IN BLOOD BY AUTOMATED COUNT: 12.7 % (ref 12.3–15.4)
GLOBULIN SER CALC-MCNC: 2.6 G/DL (ref 1.5–4.5)
GLUCOSE SERPL-MCNC: 87 MG/DL (ref 65–99)
HCT VFR BLD AUTO: 40.6 % (ref 34–46.6)
HDLC SERPL-MCNC: 55 MG/DL
HGB BLD-MCNC: 13.2 G/DL (ref 11.1–15.9)
IMM GRANULOCYTES # BLD AUTO: 0 X10E3/UL (ref 0–0.1)
IMM GRANULOCYTES NFR BLD AUTO: 0 %
INTERPRETATION, 910389: NORMAL
LDLC SERPL CALC-MCNC: 101 MG/DL (ref 0–99)
LYMPHOCYTES # BLD AUTO: 1.5 X10E3/UL (ref 0.7–3.1)
LYMPHOCYTES NFR BLD AUTO: 35 %
MCH RBC QN AUTO: 30.8 PG (ref 26.6–33)
MCHC RBC AUTO-ENTMCNC: 32.5 G/DL (ref 31.5–35.7)
MCV RBC AUTO: 95 FL (ref 79–97)
MONOCYTES # BLD AUTO: 0.4 X10E3/UL (ref 0.1–0.9)
MONOCYTES NFR BLD AUTO: 10 %
NEUTROPHILS # BLD AUTO: 1.9 X10E3/UL (ref 1.4–7)
NEUTROPHILS NFR BLD AUTO: 44 %
PLATELET # BLD AUTO: 240 X10E3/UL (ref 150–450)
POTASSIUM SERPL-SCNC: 4.2 MMOL/L (ref 3.5–5.2)
PROT SERPL-MCNC: 6.7 G/DL (ref 6–8.5)
RBC # BLD AUTO: 4.29 X10E6/UL (ref 3.77–5.28)
SODIUM SERPL-SCNC: 141 MMOL/L (ref 134–144)
T4 FREE SERPL-MCNC: 1.45 NG/DL (ref 0.82–1.77)
TRIGL SERPL-MCNC: 54 MG/DL (ref 0–149)
TSH SERPL DL<=0.005 MIU/L-ACNC: 1.76 UIU/ML (ref 0.45–4.5)
VLDLC SERPL CALC-MCNC: 11 MG/DL (ref 5–40)
WBC # BLD AUTO: 4.3 X10E3/UL (ref 3.4–10.8)

## 2019-06-18 LAB
CYTOLOGIST CVX/VAG CYTO: NORMAL
CYTOLOGY CVX/VAG DOC CYTO: NORMAL
CYTOLOGY CVX/VAG DOC THIN PREP: NORMAL
DX ICD CODE: NORMAL
HPV I/H RISK 4 DNA CVX QL PROBE+SIG AMP: NEGATIVE
Lab: NORMAL
OTHER STN SPEC: NORMAL
STAT OF ADQ CVX/VAG CYTO-IMP: NORMAL

## 2019-07-30 DIAGNOSIS — F41.9 ANXIETY: ICD-10-CM

## 2019-07-30 RX ORDER — ALPRAZOLAM 1 MG/1
TABLET ORAL
Qty: 25 TAB | Refills: 0 | OUTPATIENT
Start: 2019-07-30 | End: 2019-11-26 | Stop reason: SDUPTHER

## 2019-10-08 ENCOUNTER — OFFICE VISIT (OUTPATIENT)
Dept: INTERNAL MEDICINE CLINIC | Age: 64
End: 2019-10-08

## 2019-10-08 VITALS
BODY MASS INDEX: 23.21 KG/M2 | SYSTOLIC BLOOD PRESSURE: 109 MMHG | DIASTOLIC BLOOD PRESSURE: 77 MMHG | RESPIRATION RATE: 18 BRPM | OXYGEN SATURATION: 100 % | HEART RATE: 73 BPM | HEIGHT: 68 IN | TEMPERATURE: 97.9 F | WEIGHT: 153.13 LBS

## 2019-10-08 DIAGNOSIS — E03.9 ACQUIRED HYPOTHYROIDISM: ICD-10-CM

## 2019-10-08 DIAGNOSIS — Z01.818 PREOP GENERAL PHYSICAL EXAM: ICD-10-CM

## 2019-10-08 DIAGNOSIS — S83.282A TEAR OF LATERAL MENISCUS OF LEFT KNEE, CURRENT, UNSPECIFIED TEAR TYPE, INITIAL ENCOUNTER: Primary | ICD-10-CM

## 2019-10-08 NOTE — PATIENT INSTRUCTIONS
Knee Arthroscopy: Before Your Surgery What is knee arthroscopy? Arthroscopy is a way to find problems and do surgery inside a joint without making a large cut (incision). Your doctor puts a lighted tube with a tiny camera and surgical tools through small incisions in your knee. The camera is called an arthroscope, or scope. In this surgery, your doctor may: · Remove or repair a torn piece of cartilage or loose bone. · Replace a torn anterior cruciate ligament (ACL) with a piece of tissue. This repair is called a graft. · Smooth the rough surfaces of your joint. Most people go home on the day of the surgery or the next day. If you have a simple injury, it may take at least 6 weeks to recover. It may take longer if your doctor had to repair damaged tissue. You will need to limit activity while your knee heals. You may need to have physical therapy (rehab) to help your knee get stronger. If you have a desk job, you may be able to go back to work a few days after treatment of a simple injury. If you lift things or stand or walk a lot at work, it may be 2 to 6 weeks before you can go back. After surgery and rehab, you will probably have less pain. Your knee should be stronger. You should be able to use your knee and leg better. Some people have to avoid lifting heavy objects. Follow-up care is a key part of your treatment and safety. Be sure to make and go to all appointments, and call your doctor if you are having problems. It's also a good idea to know your test results and keep a list of the medicines you take. What happens before surgery? 
 Surgery can be stressful. This information will help you understand what you can expect. And it will help you safely prepare for surgery. 
 Preparing for surgery 
  · Understand exactly what surgery is planned, along with the risks, benefits, and other options.   
 · Tell your doctors ALL the medicines, vitamins, supplements, and herbal remedies you take. Some of these can increase the risk of bleeding or interact with anesthesia.  
  · If you take blood thinners, such as warfarin (Coumadin), clopidogrel (Plavix), or aspirin, be sure to talk to your doctor. He or she will tell you if you should stop taking these medicines before your surgery. Make sure that you understand exactly what your doctor wants you to do.  
  · Your doctor will tell you which medicines to take or stop before your surgery. You may need to stop taking certain medicines a week or more before surgery. So talk to your doctor as soon as you can.  
  · If you have an advance directive, let your doctor know. It may include a living will and a durable power of  for health care. Bring a copy to the hospital. If you don't have one, you may want to prepare one. It lets your doctor and loved ones know your health care wishes. Doctors advise that everyone prepare these papers before any type of surgery or procedure. What happens on the day of surgery? · Follow the instructions exactly about when to stop eating and drinking. If you don't, your surgery may be canceled. If your doctor told you to take your medicines on the day of surgery, take them with only a sip of water.  
  · Take a bath or shower before you come in for your surgery. Do not apply lotions, perfumes, deodorants, or nail polish.  
  · Do not shave the surgical site yourself.  
  · Take off all jewelry and piercings. And take out contact lenses, if you wear them.  
 At the hospital or surgery center · Bring a picture ID.  
  · The area for surgery is often marked to make sure there are no errors.  
  · You will be kept comfortable and safe by your anesthesia provider. The anesthesia may make you sleep. Or it may just numb the area being worked on.  
  · The surgery will take about 1 to 2 hours. It depends on how much repair needs to be done to your knee. Going home · Be sure you have someone to drive you home. Anesthesia and pain medicine make it unsafe for you to drive.  
  · You will be given more specific instructions about recovering from your surgery. They will cover things like diet, wound care, follow-up care, driving, and getting back to your normal routine. When should you call your doctor? · You have questions or concerns.  
  · You don't understand how to prepare for your surgery.  
  · You become ill before the surgery (such as fever, flu, or a cold).  
  · You need to reschedule or have changed your mind about having the surgery. Where can you learn more? Go to http://ewa-adam.info/. Enter Z490 in the search box to learn more about \"Knee Arthroscopy: Before Your Surgery. \" Current as of: June 26, 2019 Content Version: 12.2 © 1470-3426 [employer]. Care instructions adapted under license by [employer] (which disclaims liability or warranty for this information). If you have questions about a medical condition or this instruction, always ask your healthcare professional. [address] any warranty or liability for your use of this information. Meniscus Surgery: Before Your Surgery What is meniscus surgery? Meniscus surgery removes or fixes the cartilage between the bones in the knee. This cartilage is called the meniscus. Each knee has two of these rubbery pads of cartilage, one on either side. When a meniscus tears, your knee may be painful, swell, get stiff, or lock up. Your doctor may use small tools to remove parts of the damaged meniscus and smooth the edges. This is called a partial meniscectomy (say \"men-ih-SEK-tuh-bonnie\"). In some cases, tears in the meniscus can be sewn back together. But if your meniscus can't be repaired, the doctor may remove the damaged part. Meniscus surgery is usually done as arthroscopic surgery.  Your doctor uses a lighted tube called an arthroscope, or scope. He or she puts the scope and other surgical tools through small cuts in your knee. These cuts are called incisions. They leave scars that usually fade with time. The surgery will take at least 1 hour. Most people go home the same day of the surgery. You may have to use crutches after surgery. If so, be sure you have a backpack or clothes with a lot of pockets to carry items. How soon you can go back to work and your normal routine depends on your job and the type of surgery you had. If you sit at work, you may be able to go back in 1 to 2 weeks. But if you are on your feet at work, it may take 4 to 6 weeks. If you are very physically active in your job, it may take 3 to 6 months. You may need physical rehabilitation (rehab) after surgery. The program may last for several months. At first, your physical therapist will work with you. Later, you will get exercises to do on your own. After surgery and rehab, you are likely to have less pain and more flexibility in your knee. How soon you can return to sports or exercise depends on how well you follow your rehab program and how well your knee heals. If you had a partial meniscectomy, you might be able to play sports in about 1 to 2 months. If you had meniscus repair, it may be 3 to 6 months before you can play sports. Follow-up care is a key part of your treatment and safety. Be sure to make and go to all appointments, and call your doctor if you are having problems. It's also a good idea to know your test results and keep a list of the medicines you take. What happens before surgery? 
 Surgery can be stressful. This information will help you understand what you can expect. And it will help you safely prepare for surgery. 
 Preparing for surgery 
  · Understand exactly what surgery is planned, along with the risks, benefits, and other options.   
 · Tell your doctors ALL the medicines, vitamins, supplements, and herbal remedies you take. Some of these can increase the risk of bleeding or interact with anesthesia.  
  · If you take blood thinners, such as warfarin (Coumadin), clopidogrel (Plavix), or aspirin, be sure to talk to your doctor. He or she will tell you if you should stop taking these medicines before your surgery. Make sure that you understand exactly what your doctor wants you to do.  
  · Your doctor will tell you which medicines to take or stop before your surgery. You may need to stop taking certain medicines a week or more before surgery. So talk to your doctor as soon as you can.  
  · If you have an advance directive, let your doctor know. It may include a living will and a durable power of  for health care. Bring a copy to the hospital. If you don't have one, you may want to prepare one. It lets your doctor and loved ones know your health care wishes. Doctors advise that everyone prepare these papers before any type of surgery or procedure. What happens on the day of surgery? · Follow the instructions exactly about when to stop eating and drinking. If you don't, your surgery may be canceled. If your doctor told you to take your medicines on the day of surgery, take them with only a sip of water.  
  · Take a bath or shower before you come in for your surgery. Do not apply lotions, perfumes, deodorants, or nail polish.  
  · Do not shave the surgical site yourself.  
  · Take off all jewelry and piercings. And take out contact lenses, if you wear them.  
 At the hospital or surgery center · Bring a picture ID.  
  · The area for surgery is often marked to make sure there are no errors.  
  · You will be kept comfortable and safe by your anesthesia provider. The anesthesia may make you sleep. Or it may just numb the area being worked on.  
  · The surgery will take at least 1 hour.  
  · Your leg may be in a leg brace to limit motion. You may need to wear a brace for 4 to 6 weeks after surgery.   · You may have a device that applies cold treatment to your knee. Going home · Be sure you have someone to drive you home. Anesthesia and pain medicine make it unsafe for you to drive.  
  · You will be given more specific instructions about recovering from your surgery. They will cover things like diet, wound care, follow-up care, driving, and getting back to your normal routine. When should you call your doctor? · You have questions or concerns.  
  · You don't understand how to prepare for your surgery.  
  · You become ill before the surgery (such as fever, flu, or a cold).  
  · You need to reschedule or have changed your mind about having the surgery. Where can you learn more? Go to http://ewa-adam.info/. Enter H791 in the search box to learn more about \"Meniscus Surgery: Before Your Surgery. \" Current as of: June 26, 2019 Content Version: 12.2 © 1856-0926 [employer]. Care instructions adapted under license by [employer] (which disclaims liability or warranty for this information). If you have questions about a medical condition or this instruction, always ask your healthcare professional. [address] any warranty or liability for your use of this information.

## 2019-10-08 NOTE — PROGRESS NOTES
HISTORY OF PRESENT ILLNESS  Mili Kiser is a 62 y.o. female. HPI  Mili Kiser was referred for evaluation by:Dr. Lavanda Lundborg for Pre- Op Evaluation. Please see encounter details and orders for consultative summary. Type of surgery : Left knee arthroscopy, partial menisectomy, chondroplasty, Vericel biopsy. Surgery site : Left knee  Anesthesia type: General anesthesia  Date of procedure:  10/15/2019    Allergies: Allergies   Allergen Reactions    Penicillins Hives    Aspirin Anaphylaxis    Azithromycin Hives    Codeine Hives    Coumadin [Warfarin] Rash    Red Dye Swelling     Red dye # 5 - lip swelling     Sulfa (Sulfonamide Antibiotics) Hives     Latex allergy: no  Prior reactions to anesthesia:  Nausea and vomiting with anesthesia in past    Functional status:  she is able to ambulate up 2 flights of step without shortness of breath, chest pain  Prior cardiac evaluation:   None    History of DVT in 2000; had [place] filter placed. No recurrences since then. Current Outpatient Medications   Medication Sig    ALPRAZolam (XANAX) 1 mg tablet TAKE 1 TABLET BY MOUTH EVERY EVENING AS NEEDED    conjugated estrogens (PREMARIN) 0.625 mg/gram vaginal cream INSERT 1 GRAM VAGINALLY 2 TIMES A WEEK AS DIRECTED    SYNTHROID 88 mcg tablet 1 po qam    fexofenadine (ALLEGRA) 180 mg tablet Take 1 Tab by mouth daily for 360 days. (Patient taking differently: Take 180 mg by mouth as needed.)     No current facility-administered medications for this visit.       Past Medical History:   Diagnosis Date    Family history of breast cancer 9/21/2009    Hypothyroid 5/26/2009    S/P colonoscopy 9/21/2009     Past Surgical History:   Procedure Laterality Date    HX COLONOSCOPY  3/2013    HX HEENT      wisdom teeth extraction    HX ORTHOPAEDIC  1/2016    right 3rd finger trigger release    HX TONSILLECTOMY      HX TUBAL LIGATION      VASCULAR SURGERY PROCEDURE UNLIST      insertion of Greenfielf filter Social History     Tobacco Use    Smoking status: Never Smoker    Smokeless tobacco: Never Used   Substance Use Topics    Alcohol use: Yes     Alcohol/week: 0.8 standard drinks     Types: 1 Cans of beer per week     Comment: rarely    Drug use: No       Blood pressure 109/77, pulse 73, temperature 97.9 °F (36.6 °C), temperature source Oral, resp. rate 18, height 5' 8\" (1.727 m), weight 153 lb 2 oz (69.5 kg), last menstrual period 02/05/2007, SpO2 100 %. Review of Systems   Constitutional: Negative for chills, fever and malaise/fatigue. HENT: Negative for congestion and sore throat. Respiratory: Negative for cough and shortness of breath. Cardiovascular: Negative for chest pain and palpitations. Gastrointestinal: Negative for nausea and vomiting. Genitourinary: Negative for dysuria, frequency and urgency. Musculoskeletal: Positive for joint pain (left knee). Skin: Negative for rash. Neurological: Negative for dizziness, tingling and headaches. Physical Exam   Constitutional: She is oriented to person, place, and time. She appears well-developed and well-nourished. HENT:   Head: Normocephalic and atraumatic. Right Ear: External ear normal.   Left Ear: External ear normal.   Nose: Nose normal.   Mouth/Throat: Oropharynx is clear and moist.   Neck: Normal range of motion. Neck supple. No thyromegaly present. Cardiovascular: Normal rate, regular rhythm and normal heart sounds. Pulmonary/Chest: Effort normal and breath sounds normal. She has no wheezes. Abdominal: Soft. Bowel sounds are normal. There is no tenderness. There is no rebound and no guarding. Musculoskeletal:        Left knee: Tenderness found. Lateral joint line tenderness noted. Legs:  Lymphadenopathy:     She has no cervical adenopathy. Neurological: She is alert and oriented to person, place, and time. Skin: Skin is warm and dry. No rash noted. Psychiatric: She has a normal mood and affect.  Her behavior is normal.   Nursing note and vitals reviewed. ASSESSMENT and PLAN  Diagnoses and all orders for this visit:    1. Tear of lateral meniscus of left knee, current, unspecified tear type, initial encounter    2. Preop general physical exam -- considered low risk for cardiopulmonary complications related to upcoming Left knee arthroscopic procedure with General anesthesia    3.  Acquired hypothyroidism --stable      lab results and schedule of future lab studies reviewed with patient  reviewed diet, exercise and weight control  reviewed medications and side effects in detail

## 2019-11-26 DIAGNOSIS — F41.9 ANXIETY: ICD-10-CM

## 2019-11-26 RX ORDER — ALPRAZOLAM 1 MG/1
TABLET ORAL
Qty: 25 TAB | Refills: 0 | Status: SHIPPED | OUTPATIENT
Start: 2019-11-26 | End: 2020-02-25

## 2019-12-02 NOTE — TELEPHONE ENCOUNTER
Reached to patient (x3) to assist w/ scheduling a medication evaluation / follow up appointment.  Unable to leave a V/M phone just kept ringing My Chart message has been sent - thank you

## 2020-01-03 DIAGNOSIS — E03.4 HYPOTHYROIDISM DUE TO ACQUIRED ATROPHY OF THYROID: ICD-10-CM

## 2020-01-03 DIAGNOSIS — E03.9 ACQUIRED HYPOTHYROIDISM: ICD-10-CM

## 2020-01-03 RX ORDER — LEVOTHYROXINE SODIUM 88 UG/1
TABLET ORAL
Qty: 90 TAB | Refills: 2 | Status: SHIPPED | OUTPATIENT
Start: 2020-01-03 | End: 2020-09-26

## 2020-04-23 ENCOUNTER — VIRTUAL VISIT (OUTPATIENT)
Dept: INTERNAL MEDICINE CLINIC | Age: 65
End: 2020-04-23

## 2020-04-23 DIAGNOSIS — F41.9 ANXIETY: ICD-10-CM

## 2020-04-23 DIAGNOSIS — E03.9 ACQUIRED HYPOTHYROIDISM: Primary | ICD-10-CM

## 2020-04-23 RX ORDER — ALPRAZOLAM 1 MG/1
TABLET ORAL
Qty: 20 TAB | Refills: 1 | Status: SHIPPED | OUTPATIENT
Start: 2020-04-23 | End: 2020-08-18 | Stop reason: SDUPTHER

## 2020-04-23 NOTE — PROGRESS NOTES
Reached out to patient to assist w/ scheduling a medication evaluation / follow up appointment Ov in August per PCP.  Patient has scheduled an appointment for 08/18 @ 9:15 - thank you

## 2020-04-23 NOTE — PROGRESS NOTES
Consent: Trish Ram, who was seen by synchronous (real-time) audio-video technology, and/or her healthcare decision maker, is aware that this patient-initiated, Telehealth encounter on 4/23/2020 is a billable service, with coverage as determined by her insurance carrier. She is aware that she may receive a bill and has provided verbal consent to proceed: YES  712  Subjective: Trish Ram is a 62 y.o. female who was seen for Medication Evaluation      Anxiety and rls-uses 1/4 of a 1 mg xanax about 3 nights a week-got 20 tabs 2/25 and has 3 left-encouraged to not escalate use and discussed habituation  Thyroid-on 88 mcg-no sxs of hypo or hyperthyroid and declines labs now  Last were good in 6/19-needs labs by end of summer    Current Outpatient Medications   Medication Sig    ALPRAZolam (XANAX) 1 mg tablet 1/4 tablet qhs prn    SYNTHROID 88 mcg tablet TAKE 1 TABLET BY MOUTH EVERY MORNING    conjugated estrogens (PREMARIN) 0.625 mg/gram vaginal cream INSERT 1 GRAM VAGINALLY 2 TIMES A WEEK AS DIRECTED    fexofenadine (ALLEGRA) 180 mg tablet Take 1 Tab by mouth daily for 360 days. (Patient taking differently: Take 180 mg by mouth as needed.)     No current facility-administered medications for this visit.         Allergies   Allergen Reactions    Penicillins Hives    Aspirin Anaphylaxis    Azithromycin Hives    Codeine Hives    Coumadin [Warfarin] Rash    Red Dye Swelling     Red dye # 5 - lip swelling     Sulfa (Sulfonamide Antibiotics) Hives       Past Medical History:   Diagnosis Date    Family history of breast cancer 9/21/2009    Hypothyroid 5/26/2009    S/P colonoscopy 9/21/2009       ROS  All other systems reviewed and negative, unless mentioned in HPI    Objective:   Vital Signs: (As obtained by patient/caregiver at home)  Visit Vitals  LMP 02/05/2007        [INSTRUCTIONS:  \"[x]\" Indicates a positive item  \"[]\" Indicates a negative item  -- DELETE ALL ITEMS NOT EXAMINED]    Constitutional: [x] Appears well-developed and well-nourished [x] No apparent distress      [] Abnormal -     Mental status: [x] Alert and awake  [x] Oriented to person/place/time [x] Able to follow commands    [] Abnormal -     Eyes:   EOM    [x]  Normal    [] Abnormal -   Sclera  [x]  Normal    [] Abnormal -          Discharge [x]  None visible   [] Abnormal -     HENT: [x] Normocephalic, atraumatic  [] Abnormal -       External Ears [x] Normal  [] Abnormal -    Neck: [x] No visualized mass [] Abnormal -     Pulmonary/Chest: [x] Respiratory effort normal   [x] No visualized signs of difficulty breathing or respiratory distress        [] Abnormal -      Musculoskeletal:            [x] Normal range of motion of neck        [] Abnormal -     Neurological:        [x] No Facial Asymmetry (Cranial nerve 7 motor function) (limited exam due to video visit)          [x] No gaze palsy        [] Abnormal -          Skin:        [x] No significant exanthematous lesions or discoloration noted on facial skin         [] Abnormal -            Psychiatric:       [x] Normal Affect [] Abnormal -           Other pertinent observable physical exam findings:-        Assessment & Plan:   Diagnoses and all orders for this visit:    1. Acquired hypothyroidism-cont dose and appt in aug and needs labs then  2. Anxiety  -     ALPRAZolam (XANAX) 1 mg tablet; 1/4 tablet qhs prn    Make the current rx last until appt  In  August  Side effects possible reviewed in detail          We discussed the expected course, resolution and complications of the diagnosis(es) in detail. Medication risks, benefits, costs, interactions, and alternatives were discussed as indicated. I advised her to contact the office if her condition worsens, changes or fails to improve as anticipated. She expressed understanding with the diagnosis(es) and plan. Brooke Rai is a 62 y.o. female being evaluated by a video visit encounter for concerns as above.   A caregiver was present when appropriate. Due to this being a TeleHealth encounter (During XPMGY-18 public health emergency), evaluation of the following organ systems was limited: Vitals/Constitutional/EENT/Resp/CV/GI//MS/Neuro/Skin/Heme-Lymph-Imm. Pursuant to the emergency declaration under the [address] [place] [address] waiver authority and the [employer] Act, this Virtual  Visit was conducted, with patient's (and/or legal guardian's) consent, to reduce the patient's risk of exposure to COVID-19 and provide necessary medical care. Services were provided through a video synchronous discussion virtually to substitute for in-person clinic visit. Patient and provider were located at their individual homes.

## 2020-08-18 ENCOUNTER — OFFICE VISIT (OUTPATIENT)
Dept: INTERNAL MEDICINE CLINIC | Age: 65
End: 2020-08-18
Payer: MEDICAID

## 2020-08-18 VITALS
HEART RATE: 78 BPM | RESPIRATION RATE: 16 BRPM | SYSTOLIC BLOOD PRESSURE: 108 MMHG | DIASTOLIC BLOOD PRESSURE: 80 MMHG | HEIGHT: 68 IN | BODY MASS INDEX: 22.73 KG/M2 | OXYGEN SATURATION: 98 % | WEIGHT: 150 LBS

## 2020-08-18 DIAGNOSIS — F41.9 ANXIETY: ICD-10-CM

## 2020-08-18 DIAGNOSIS — Z78.0 POSTMENOPAUSE: ICD-10-CM

## 2020-08-18 DIAGNOSIS — F51.02 ADJUSTMENT INSOMNIA: ICD-10-CM

## 2020-08-18 DIAGNOSIS — E03.9 ACQUIRED HYPOTHYROIDISM: Primary | ICD-10-CM

## 2020-08-18 PROCEDURE — 99214 OFFICE O/P EST MOD 30 MIN: CPT | Performed by: INTERNAL MEDICINE

## 2020-08-18 RX ORDER — ALPRAZOLAM 1 MG/1
TABLET ORAL
Qty: 20 TAB | Refills: 2 | Status: SHIPPED | OUTPATIENT
Start: 2020-08-18 | End: 2020-11-18

## 2020-08-18 NOTE — PROGRESS NOTES
HISTORY OF PRESENT ILLNESS  Burgess Wilson is a 60 y.o. female. HPI  Seen at follow up. Issues:  1. Insomnia. She uses Xanax 1 mg, quarter of a tab prn. She is aware not to escalate usage, not to take it every night. Has used roughly 30 pills since April. Discussed possibly using other medication such as SSRI. For now, will continue with prn Xanax. 2. Thyroid. No symptoms of hypo or hyperthyroid. Due for levels. She notes [facility] has given her a TDAP and Shingrix and she will get us the dates. Review of Systems   Constitutional: Negative for chills, fever, malaise/fatigue and weight loss. Respiratory: Negative for cough, shortness of breath and wheezing. Cardiovascular: Negative for chest pain, palpitations, orthopnea, leg swelling and PND. Gastrointestinal: Negative for abdominal pain, constipation, diarrhea, heartburn, nausea and vomiting. Musculoskeletal: Negative for myalgias. Neurological: Negative for dizziness and headaches. Psychiatric/Behavioral: The patient has insomnia. Physical Exam  Vitals signs and nursing note reviewed. Constitutional:       Appearance: She is well-developed. HENT:      Head: Normocephalic and atraumatic. Neck:      Musculoskeletal: Normal range of motion and neck supple. Thyroid: No thyromegaly. Vascular: No carotid bruit. Cardiovascular:      Rate and Rhythm: Normal rate and regular rhythm. Heart sounds: Normal heart sounds, S1 normal and S2 normal. No murmur. Pulmonary:      Effort: Pulmonary effort is normal. No respiratory distress. Breath sounds: Normal breath sounds. No wheezing or rales. Lymphadenopathy:      Cervical: No cervical adenopathy. Neurological:      Mental Status: She is alert and oriented to person, place, and time. Psychiatric:         Behavior: Behavior normal.         ASSESSMENT and PLAN  Diagnoses and all orders for this visit:    1.  Acquired hypothyroidism  -     METABOLIC PANEL, COMPREHENSIVE; Future  -     LIPID PANEL; Future  -     TSH 3RD GENERATION; Future  -     T4, FREE; Future    2. Anxiety  -     ALPRAZolam (XANAX) 1 mg tablet; 1/4 tablet qhs prn    3. Postmenopause  -     CBC WITH AUTOMATED DIFF; Future    4.  Adjustment insomnia    Discussion of alternative strategies to help with sleep   For  Now cont prn xanax caution not to escalate use  appt in 6mo for cpe

## 2020-08-19 LAB
ALBUMIN SERPL-MCNC: 4.2 G/DL (ref 3.8–4.8)
ALBUMIN/GLOB SERPL: 1.6 {RATIO} (ref 1.2–2.2)
ALP SERPL-CCNC: 121 IU/L (ref 39–117)
ALT SERPL-CCNC: 22 IU/L (ref 0–32)
AST SERPL-CCNC: 31 IU/L (ref 0–40)
BASOPHILS # BLD AUTO: 0.1 X10E3/UL (ref 0–0.2)
BASOPHILS NFR BLD AUTO: 1 %
BILIRUB SERPL-MCNC: 0.9 MG/DL (ref 0–1.2)
BUN SERPL-MCNC: 14 MG/DL (ref 8–27)
BUN/CREAT SERPL: 19 (ref 12–28)
CALCIUM SERPL-MCNC: 9.8 MG/DL (ref 8.7–10.3)
CHLORIDE SERPL-SCNC: 103 MMOL/L (ref 96–106)
CHOLEST SERPL-MCNC: 190 MG/DL (ref 100–199)
CO2 SERPL-SCNC: 24 MMOL/L (ref 20–29)
CREAT SERPL-MCNC: 0.75 MG/DL (ref 0.57–1)
EOSINOPHIL # BLD AUTO: 0.5 X10E3/UL (ref 0–0.4)
EOSINOPHIL NFR BLD AUTO: 10 %
ERYTHROCYTE [DISTWIDTH] IN BLOOD BY AUTOMATED COUNT: 11.5 % (ref 11.7–15.4)
GLOBULIN SER CALC-MCNC: 2.7 G/DL (ref 1.5–4.5)
GLUCOSE SERPL-MCNC: 98 MG/DL (ref 65–99)
HCT VFR BLD AUTO: 38.7 % (ref 34–46.6)
HDLC SERPL-MCNC: 61 MG/DL
HGB BLD-MCNC: 13.3 G/DL (ref 11.1–15.9)
IMM GRANULOCYTES # BLD AUTO: 0 X10E3/UL (ref 0–0.1)
IMM GRANULOCYTES NFR BLD AUTO: 0 %
INTERPRETATION, 910389: NORMAL
LDLC SERPL CALC-MCNC: 116 MG/DL (ref 0–99)
LYMPHOCYTES # BLD AUTO: 1.6 X10E3/UL (ref 0.7–3.1)
LYMPHOCYTES NFR BLD AUTO: 34 %
MCH RBC QN AUTO: 31.4 PG (ref 26.6–33)
MCHC RBC AUTO-ENTMCNC: 34.4 G/DL (ref 31.5–35.7)
MCV RBC AUTO: 91 FL (ref 79–97)
MONOCYTES # BLD AUTO: 0.4 X10E3/UL (ref 0.1–0.9)
MONOCYTES NFR BLD AUTO: 9 %
NEUTROPHILS # BLD AUTO: 2.1 X10E3/UL (ref 1.4–7)
NEUTROPHILS NFR BLD AUTO: 46 %
PLATELET # BLD AUTO: 237 X10E3/UL (ref 150–450)
POTASSIUM SERPL-SCNC: 4.4 MMOL/L (ref 3.5–5.2)
PROT SERPL-MCNC: 6.9 G/DL (ref 6–8.5)
RBC # BLD AUTO: 4.24 X10E6/UL (ref 3.77–5.28)
SODIUM SERPL-SCNC: 140 MMOL/L (ref 134–144)
T4 FREE SERPL-MCNC: 1.51 NG/DL (ref 0.82–1.77)
TRIGL SERPL-MCNC: 67 MG/DL (ref 0–149)
TSH SERPL DL<=0.005 MIU/L-ACNC: 2.28 UIU/ML (ref 0.45–4.5)
VLDLC SERPL CALC-MCNC: 13 MG/DL (ref 5–40)
WBC # BLD AUTO: 4.6 X10E3/UL (ref 3.4–10.8)

## 2020-09-26 DIAGNOSIS — E03.9 ACQUIRED HYPOTHYROIDISM: ICD-10-CM

## 2020-09-26 DIAGNOSIS — E03.4 HYPOTHYROIDISM DUE TO ACQUIRED ATROPHY OF THYROID: ICD-10-CM

## 2020-09-26 RX ORDER — LEVOTHYROXINE SODIUM 88 UG/1
TABLET ORAL
Qty: 90 TAB | Refills: 2 | Status: SHIPPED | OUTPATIENT
Start: 2020-09-26 | End: 2021-07-12

## 2020-11-18 DIAGNOSIS — F41.9 ANXIETY: ICD-10-CM

## 2020-11-18 RX ORDER — ALPRAZOLAM 1 MG/1
TABLET ORAL
Qty: 20 TAB | Refills: 1 | Status: SHIPPED | OUTPATIENT
Start: 2020-11-18 | End: 2021-08-10 | Stop reason: SDUPTHER

## 2021-03-04 ENCOUNTER — OFFICE VISIT (OUTPATIENT)
Dept: INTERNAL MEDICINE CLINIC | Age: 66
End: 2021-03-04
Payer: MEDICAID

## 2021-03-04 VITALS
TEMPERATURE: 97.8 F | HEIGHT: 68 IN | SYSTOLIC BLOOD PRESSURE: 112 MMHG | HEART RATE: 66 BPM | RESPIRATION RATE: 18 BRPM | BODY MASS INDEX: 23.34 KG/M2 | OXYGEN SATURATION: 99 % | WEIGHT: 154 LBS | DIASTOLIC BLOOD PRESSURE: 70 MMHG

## 2021-03-04 DIAGNOSIS — Z78.0 POSTMENOPAUSE: ICD-10-CM

## 2021-03-04 DIAGNOSIS — Z00.00 WELL WOMAN EXAM (NO GYNECOLOGICAL EXAM): Primary | ICD-10-CM

## 2021-03-04 PROCEDURE — 99396 PREV VISIT EST AGE 40-64: CPT | Performed by: INTERNAL MEDICINE

## 2021-03-04 RX ORDER — CONJUGATED ESTROGENS 0.62 MG/G
CREAM VAGINAL
Qty: 90 G | Refills: 1 | Status: SHIPPED | OUTPATIENT
Start: 2021-03-04

## 2021-03-04 NOTE — PROGRESS NOTES
Subjective:   60 y.o. female for Well Woman Check. Pap in 6/19 normal  mammo due  Cologuard normal in 7/19  dexa in 6/18  Swims 5 days a week and hopes to get back to yoga classes  Daughter got  this year  Uses xanax 20 pills in last 4 mo      Patient Active Problem List    Diagnosis Date Noted    Osteopenia of multiple sites 07/12/2018    Family history of breast cancer 09/21/2009    S/P colonoscopy 09/21/2009    Hypothyroid 05/26/2009    Fibromyalgia 05/26/2009    DVT 05/26/2009     Current Outpatient Medications   Medication Sig Dispense Refill    conjugated estrogens (Premarin) 0.625 mg/gram vaginal cream INSERT 1 GRAM VAGINALLY 2 TIMES A WEEK AS DIRECTED 90 g 1    ALPRAZolam (XANAX) 1 mg tablet TAKE 1/4 TABLET BY MOUTH EVERY NIGHT AT BEDTIME AS NEEDED 20 Tab 1    Synthroid 88 mcg tablet TAKE 1 TABLET BY MOUTH EVERY MORNING 90 Tab 2    fexofenadine (ALLEGRA) 180 mg tablet Take 1 Tab by mouth daily for 360 days.  (Patient taking differently: Take 180 mg by mouth as needed.) 30 Tab 5     Allergies   Allergen Reactions    Penicillins Hives    Aspirin Anaphylaxis    Azithromycin Hives    Codeine Hives    Coumadin [Warfarin] Rash    Red Dye Swelling     Red dye # 5 - lip swelling     Sulfa (Sulfonamide Antibiotics) Hives     Past Medical History:   Diagnosis Date    Family history of breast cancer 9/21/2009    Hypothyroid 5/26/2009    S/P colonoscopy 9/21/2009     Past Surgical History:   Procedure Laterality Date    HX COLONOSCOPY  3/2013    HX HEENT      wisdom teeth extraction    HX ORTHOPAEDIC  1/2016    right 3rd finger trigger release    HX TONSILLECTOMY      HX TUBAL LIGATION      VASCULAR SURGERY PROCEDURE UNLIST      insertion of Greenfi[place] filter     Family History   Problem Relation Age of Onset    Cancer Mother         breast ca 61, copd age 67    Lung Disease Mother     Heart Failure Father         mi age 62    Heart Disease Father     Hypertension Father    Hyde Cancer Sister         breast ca 50    Thyroid Disease Sister     Thyroid Disease Brother     Cancer Sister         thyroid cancer    Diabetes Sister         Type 1 DM    No Known Problems Son     No Known Problems Daughter     No Known Problems Son      Social History     Tobacco Use    Smoking status: Never Smoker    Smokeless tobacco: Never Used   Substance Use Topics    Alcohol use: Yes     Alcohol/week: 0.8 standard drinks     Types: 1 Cans of beer per week     Comment: rarely             ROS: Feeling generally well. No TIA's or unusual headaches, no dysphagia. No prolonged cough. No dyspnea or chest pain on exertion. No abdominal pain, change in bowel habits, black or bloody stools. No urinary tract symptoms. No new or unusual musculoskeletal symptoms. Specific concerns today: none. Objective: The patient appears well, alert, oriented x 3, in no distress. Visit Vitals  /70 (BP 1 Location: Left upper arm, BP Patient Position: Sitting, BP Cuff Size: Large adult)   Pulse 66   Temp 97.8 °F (36.6 °C) (Oral)   Resp 18   Ht 5' 8\" (1.727 m)   Wt 154 lb (69.9 kg)   LMP 02/05/2007   SpO2 99%   BMI 23.42 kg/m²     ENT normal.  Neck supple. No adenopathy or thyromegaly. SHABBIR. Lungs are clear, good air entry, no wheezes, rhonchi or rales. S1 and S2 normal, no murmurs, regular rate and rhythm. Abdomen soft without tenderness, guarding, mass or organomegaly. Extremities show no edema, normal peripheral pulses. Neurological is normal, no focal findings. Breast no masses axillary nodes or discharge    Assessment/Plan:   Well Woman  continue present plan  Diagnoses and all orders for this visit:    1. Well woman exam (no gynecological exam)  -     [facility] 3D [place] W MAMMO BI SCREENING INCL CAD; Future  -     LIPID PANEL; Future  -     TSH 3RD GENERATION; Future  -     METABOLIC PANEL, COMPREHENSIVE; Future  -     CBC WITH AUTOMATED DIFF; Future    2.  Postmenopause  -     conjugated estrogens (Premarin) 0.625 mg/gram vaginal cream; INSERT 1 GRAM VAGINALLY 2 TIMES A WEEK AS DIRECTED  -     VITAMIN D, 25 HYDROXY; Future

## 2021-03-05 LAB
25(OH)D3+25(OH)D2 SERPL-MCNC: 35.4 NG/ML (ref 30–100)
ALBUMIN SERPL-MCNC: 4.1 G/DL (ref 3.8–4.8)
ALBUMIN/GLOB SERPL: 1.6 {RATIO} (ref 1.2–2.2)
ALP SERPL-CCNC: 120 IU/L (ref 39–117)
ALT SERPL-CCNC: 20 IU/L (ref 0–32)
AST SERPL-CCNC: 26 IU/L (ref 0–40)
BASOPHILS # BLD AUTO: 0.1 X10E3/UL (ref 0–0.2)
BASOPHILS NFR BLD AUTO: 1 %
BILIRUB SERPL-MCNC: 0.6 MG/DL (ref 0–1.2)
BUN SERPL-MCNC: 14 MG/DL (ref 8–27)
BUN/CREAT SERPL: 17 (ref 12–28)
CALCIUM SERPL-MCNC: 9.5 MG/DL (ref 8.7–10.3)
CHLORIDE SERPL-SCNC: 105 MMOL/L (ref 96–106)
CHOLEST SERPL-MCNC: 180 MG/DL (ref 100–199)
CO2 SERPL-SCNC: 24 MMOL/L (ref 20–29)
CREAT SERPL-MCNC: 0.81 MG/DL (ref 0.57–1)
EOSINOPHIL # BLD AUTO: 0.4 X10E3/UL (ref 0–0.4)
EOSINOPHIL NFR BLD AUTO: 9 %
ERYTHROCYTE [DISTWIDTH] IN BLOOD BY AUTOMATED COUNT: 11.8 % (ref 11.7–15.4)
GLOBULIN SER CALC-MCNC: 2.6 G/DL (ref 1.5–4.5)
GLUCOSE SERPL-MCNC: 94 MG/DL (ref 65–99)
HCT VFR BLD AUTO: 41.8 % (ref 34–46.6)
HDLC SERPL-MCNC: 53 MG/DL
HGB BLD-MCNC: 13.4 G/DL (ref 11.1–15.9)
IMM GRANULOCYTES # BLD AUTO: 0 X10E3/UL (ref 0–0.1)
IMM GRANULOCYTES NFR BLD AUTO: 0 %
IMP & REVIEW OF LAB RESULTS: NORMAL
LDLC SERPL CALC-MCNC: 114 MG/DL (ref 0–99)
LYMPHOCYTES # BLD AUTO: 1.4 X10E3/UL (ref 0.7–3.1)
LYMPHOCYTES NFR BLD AUTO: 35 %
MCH RBC QN AUTO: 30.5 PG (ref 26.6–33)
MCHC RBC AUTO-ENTMCNC: 32.1 G/DL (ref 31.5–35.7)
MCV RBC AUTO: 95 FL (ref 79–97)
MONOCYTES # BLD AUTO: 0.4 X10E3/UL (ref 0.1–0.9)
MONOCYTES NFR BLD AUTO: 10 %
NEUTROPHILS # BLD AUTO: 1.8 X10E3/UL (ref 1.4–7)
NEUTROPHILS NFR BLD AUTO: 45 %
PLATELET # BLD AUTO: 253 X10E3/UL (ref 150–450)
POTASSIUM SERPL-SCNC: 4.6 MMOL/L (ref 3.5–5.2)
PROT SERPL-MCNC: 6.7 G/DL (ref 6–8.5)
RBC # BLD AUTO: 4.4 X10E6/UL (ref 3.77–5.28)
SODIUM SERPL-SCNC: 140 MMOL/L (ref 134–144)
TRIGL SERPL-MCNC: 66 MG/DL (ref 0–149)
TSH SERPL DL<=0.005 MIU/L-ACNC: 3.43 UIU/ML (ref 0.45–4.5)
VLDLC SERPL CALC-MCNC: 13 MG/DL (ref 5–40)
WBC # BLD AUTO: 4 X10E3/UL (ref 3.4–10.8)

## 2021-07-11 DIAGNOSIS — E03.4 HYPOTHYROIDISM DUE TO ACQUIRED ATROPHY OF THYROID: ICD-10-CM

## 2021-07-11 DIAGNOSIS — E03.9 ACQUIRED HYPOTHYROIDISM: ICD-10-CM

## 2021-07-12 RX ORDER — LEVOTHYROXINE SODIUM 88 UG/1
TABLET ORAL
Qty: 90 TABLET | Refills: 2 | Status: SHIPPED | OUTPATIENT
Start: 2021-07-12 | End: 2022-05-07

## 2021-08-10 ENCOUNTER — PATIENT MESSAGE (OUTPATIENT)
Dept: INTERNAL MEDICINE CLINIC | Age: 66
End: 2021-08-10

## 2021-08-10 DIAGNOSIS — F41.9 ANXIETY: ICD-10-CM

## 2021-08-10 RX ORDER — ALPRAZOLAM 1 MG/1
TABLET ORAL
Qty: 20 TABLET | Refills: 1 | Status: SHIPPED | OUTPATIENT
Start: 2021-08-10 | End: 2022-03-17 | Stop reason: SDUPTHER

## 2022-02-15 ENCOUNTER — HOSPITAL ENCOUNTER (OUTPATIENT)
Dept: LAB | Age: 67
Discharge: HOME | End: 2022-02-15
Payer: MEDICARE

## 2022-02-15 ENCOUNTER — OFFICE VISIT (OUTPATIENT)
Dept: INTERNAL MEDICINE CLINIC | Age: 67
End: 2022-02-15

## 2022-02-15 VITALS
WEIGHT: 157.6 LBS | BODY MASS INDEX: 23.89 KG/M2 | TEMPERATURE: 97.4 F | HEART RATE: 72 BPM | OXYGEN SATURATION: 99 % | RESPIRATION RATE: 16 BRPM | DIASTOLIC BLOOD PRESSURE: 80 MMHG | HEIGHT: 68 IN | SYSTOLIC BLOOD PRESSURE: 121 MMHG

## 2022-02-15 DIAGNOSIS — Z78.0 POSTMENOPAUSAL: ICD-10-CM

## 2022-02-15 DIAGNOSIS — N95.2 ATROPHIC VAGINITIS: ICD-10-CM

## 2022-02-15 DIAGNOSIS — Z91.89 GYN EXAM FOR HIGH-RISK MEDICARE PATIENT: ICD-10-CM

## 2022-02-15 DIAGNOSIS — Z23 ENCOUNTER FOR IMMUNIZATION: ICD-10-CM

## 2022-02-15 DIAGNOSIS — E03.9 ACQUIRED HYPOTHYROIDISM: ICD-10-CM

## 2022-02-15 DIAGNOSIS — Z01.419 WELL WOMAN EXAM WITH ROUTINE GYNECOLOGICAL EXAM: Primary | ICD-10-CM

## 2022-02-15 LAB
ALBUMIN SERPL-MCNC: 3.9 G/DL (ref 3.5–5)
ALBUMIN/GLOB SERPL: 1.3 {RATIO} (ref 1.1–2.2)
ALP SERPL-CCNC: 118 U/L (ref 45–117)
ALT SERPL-CCNC: 27 U/L (ref 12–78)
ANION GAP SERPL CALC-SCNC: 1 MMOL/L (ref 5–15)
AST SERPL-CCNC: 30 U/L (ref 15–37)
BASOPHILS # BLD: 0.1 K/UL (ref 0–0.1)
BASOPHILS NFR BLD: 1 % (ref 0–1)
BILIRUB SERPL-MCNC: 1 MG/DL (ref 0.2–1)
BUN SERPL-MCNC: 14 MG/DL (ref 6–20)
BUN/CREAT SERPL: 18 (ref 12–20)
CALCIUM SERPL-MCNC: 9.8 MG/DL (ref 8.5–10.1)
CHLORIDE SERPL-SCNC: 108 MMOL/L (ref 97–108)
CHOLEST SERPL-MCNC: 194 MG/DL
CO2 SERPL-SCNC: 29 MMOL/L (ref 21–32)
CREAT SERPL-MCNC: 0.77 MG/DL (ref 0.55–1.02)
DIFFERENTIAL METHOD BLD: ABNORMAL
EOSINOPHIL # BLD: 0.4 K/UL (ref 0–0.4)
EOSINOPHIL NFR BLD: 9 % (ref 0–7)
ERYTHROCYTE [DISTWIDTH] IN BLOOD BY AUTOMATED COUNT: 12.4 % (ref 11.5–14.5)
FOLATE SERPL-MCNC: 22.5 NG/ML (ref 5–21)
GLOBULIN SER CALC-MCNC: 3.1 G/DL (ref 2–4)
GLUCOSE SERPL-MCNC: 97 MG/DL (ref 65–100)
HCT VFR BLD AUTO: 41.5 % (ref 35–47)
HDLC SERPL-MCNC: 60 MG/DL
HDLC SERPL: 3.2 {RATIO} (ref 0–5)
HGB BLD-MCNC: 13.3 G/DL (ref 11.5–16)
IMM GRANULOCYTES # BLD AUTO: 0 K/UL (ref 0–0.04)
IMM GRANULOCYTES NFR BLD AUTO: 0 % (ref 0–0.5)
LDLC SERPL CALC-MCNC: 122.2 MG/DL (ref 0–100)
LYMPHOCYTES # BLD: 1.6 K/UL (ref 0.8–3.5)
LYMPHOCYTES NFR BLD: 36 % (ref 12–49)
MCH RBC QN AUTO: 31.5 PG (ref 26–34)
MCHC RBC AUTO-ENTMCNC: 32 G/DL (ref 30–36.5)
MCV RBC AUTO: 98.3 FL (ref 80–99)
MONOCYTES # BLD: 0.5 K/UL (ref 0–1)
MONOCYTES NFR BLD: 10 % (ref 5–13)
NEUTS SEG # BLD: 2 K/UL (ref 1.8–8)
NEUTS SEG NFR BLD: 44 % (ref 32–75)
NRBC # BLD: 0 K/UL (ref 0–0.01)
NRBC BLD-RTO: 0 PER 100 WBC
PLATELET # BLD AUTO: 248 K/UL (ref 150–400)
PMV BLD AUTO: 11.6 FL (ref 8.9–12.9)
POTASSIUM SERPL-SCNC: 4.4 MMOL/L (ref 3.5–5.1)
PROT SERPL-MCNC: 7 G/DL (ref 6.4–8.2)
RBC # BLD AUTO: 4.22 M/UL (ref 3.8–5.2)
SODIUM SERPL-SCNC: 138 MMOL/L (ref 136–145)
T4 FREE SERPL-MCNC: 1.2 NG/DL (ref 0.8–1.5)
TRIGL SERPL-MCNC: 59 MG/DL (ref ?–150)
TSH SERPL DL<=0.05 MIU/L-ACNC: 2.98 UIU/ML (ref 0.36–3.74)
VIT B12 SERPL-MCNC: 317 PG/ML (ref 193–986)
VLDLC SERPL CALC-MCNC: 11.8 MG/DL
WBC # BLD AUTO: 4.4 K/UL (ref 3.6–11)

## 2022-02-15 PROCEDURE — G8510 SCR DEP NEG, NO PLAN REQD: HCPCS | Performed by: INTERNAL MEDICINE

## 2022-02-15 PROCEDURE — G9899 SCRN MAM PERF RSLTS DOC: HCPCS | Performed by: INTERNAL MEDICINE

## 2022-02-15 PROCEDURE — 1101F PT FALLS ASSESS-DOCD LE1/YR: CPT | Performed by: INTERNAL MEDICINE

## 2022-02-15 PROCEDURE — 90732 PPSV23 VACC 2 YRS+ SUBQ/IM: CPT | Performed by: INTERNAL MEDICINE

## 2022-02-15 PROCEDURE — G0101 CA SCREEN;PELVIC/BREAST EXAM: HCPCS | Performed by: INTERNAL MEDICINE

## 2022-02-15 PROCEDURE — 1090F PRES/ABSN URINE INCON ASSESS: CPT | Performed by: INTERNAL MEDICINE

## 2022-02-15 PROCEDURE — G8427 DOCREV CUR MEDS BY ELIG CLIN: HCPCS | Performed by: INTERNAL MEDICINE

## 2022-02-15 PROCEDURE — G0009 ADMIN PNEUMOCOCCAL VACCINE: HCPCS | Performed by: INTERNAL MEDICINE

## 2022-02-15 PROCEDURE — G8536 NO DOC ELDER MAL SCRN: HCPCS | Performed by: INTERNAL MEDICINE

## 2022-02-15 PROCEDURE — 99213 OFFICE O/P EST LOW 20 MIN: CPT | Performed by: INTERNAL MEDICINE

## 2022-02-15 PROCEDURE — 3017F COLORECTAL CA SCREEN DOC REV: CPT | Performed by: INTERNAL MEDICINE

## 2022-02-15 PROCEDURE — 88175 CYTOPATH C/V AUTO FLUID REDO: CPT

## 2022-02-15 PROCEDURE — 87624 HPV HI-RISK TYP POOLED RSLT: CPT

## 2022-02-15 PROCEDURE — G8420 CALC BMI NORM PARAMETERS: HCPCS | Performed by: INTERNAL MEDICINE

## 2022-02-16 NOTE — PROGRESS NOTES
Subjective:   73 y.o. female for Well Woman Check. Patient's last menstrual period was 02/05/2007. Social History: single partner, contraception - post menopausal status. Pertinent past medical hstory: pap in 2019   Colonoscopy 2019  dexa 2018  mammo  Every year  Dt Nadia Wilson is pregnant  Son sahara doing well  Jv Dejesus had a mammo at [facility] last spring and was advised to stop her topical estrogen  Cream which she did for a period of time but felt poorly off it including vaginal discomfort and escalation of neuropathy sxs in legs-inc urinary urgency  Ultimately repeat  mammo normal and she went back  On topical estrogen biweekly and feels much better. Patient Active Problem List    Diagnosis Date Noted    Osteopenia of multiple sites 07/12/2018    Family history of breast cancer 09/21/2009    S/P colonoscopy 09/21/2009    Hypothyroid 05/26/2009    Fibromyalgia 05/26/2009    DVT 05/26/2009     Current Outpatient Medications   Medication Sig Dispense Refill    ALPRAZolam (XANAX) 1 mg tablet TAKE 1/4 TABLET BY MOUTH EVERY NIGHT AT BEDTIME AS NEEDED 20 Tablet 1    Synthroid 88 mcg tablet TAKE 1 TABLET BY MOUTH EVERY MORNING 90 Tablet 2    conjugated estrogens (Premarin) 0.625 mg/gram vaginal cream INSERT 1 GRAM VAGINALLY 2 TIMES A WEEK AS DIRECTED 90 g 1    fexofenadine (ALLEGRA) 180 mg tablet Take 1 Tab by mouth daily for 360 days.  (Patient taking differently: Take 180 mg by mouth as needed.) 30 Tab 5     Allergies   Allergen Reactions    Penicillins Hives    Aspirin Anaphylaxis    Azithromycin Hives    Codeine Hives    Coumadin [Warfarin] Rash    Red Dye Swelling     Red dye # 5 - lip swelling     Sulfa (Sulfonamide Antibiotics) Hives     Past Medical History:   Diagnosis Date    Family history of breast cancer 9/21/2009    Hypothyroid 5/26/2009    S/P colonoscopy 9/21/2009     Past Surgical History:   Procedure Laterality Date    HX COLONOSCOPY  3/2013    HX HEENT      wisdom teeth extraction    HX ORTHOPAEDIC  1/2016    right 3rd finger trigger release    HX TONSILLECTOMY      HX TUBAL LIGATION      VASCULAR SURGERY PROCEDURE UNLIST      insertion of Greenfielf filter     Family History   Problem Relation Age of Onset    Cancer Mother         breast ca 61, copd age 67    Lung Disease Mother     Heart Failure Father         mi age 62    Heart Disease Father     Hypertension Father     Cancer Sister         breast ca 50    Thyroid Disease Sister     Thyroid Disease Brother     Cancer Sister         thyroid cancer    Diabetes Sister         Type 1 DM    No Known Problems Son     No Known Problems Daughter     No Known Problems Son      Social History     Tobacco Use    Smoking status: Never Smoker    Smokeless tobacco: Never Used   Substance Use Topics    Alcohol use: Yes     Alcohol/week: 0.8 standard drinks     Types: 1 Cans of beer per week     Comment: rarely        ROS:  Feeling well. No dyspnea or chest pain on exertion. No abdominal pain, change in bowel habits, black or bloody stools. No urinary tract symptoms. GYN ROS: no breast pain or new or enlarging lumps on self exam, no vaginal bleeding, no discharge or pelvic pain, no hot flashes. No neurological complaints. Objective:     Visit Vitals  /80 (BP 1 Location: Left upper arm, BP Patient Position: Sitting, BP Cuff Size: Adult)   Pulse 72   Temp 97.4 °F (36.3 °C) (Oral)   Resp 16   Ht 5' 8\" (1.727 m)   Wt 157 lb 9.6 oz (71.5 kg)   LMP 02/05/2007   SpO2 99%   BMI 23.96 kg/m²     The patient appears well, alert, oriented x 3, in no distress. ENT normal.  Neck supple. No adenopathy or thyromegaly. SHABBIR. Lungs are clear, good air entry, no wheezes, rhonchi or rales. S1 and S2 normal, no murmurs, regular rate and rhythm. Abdomen soft without tenderness, guarding, mass or organomegaly. Extremities show no edema, normal peripheral pulses. Neurological is normal, no focal findings.     BREAST EXAM: breasts appear normal, no suspicious masses, no skin or nipple changes or axillary nodes    PELVIC EXAM: normal external genitalia, vulva, vagina, cervix, uterus and adnexa    Assessment/Plan:   well woman  mammogram  pap smear  Diagnoses and all orders for this visit:    1. Well woman exam with routine gynecological exam  -     CBC WITH AUTOMATED DIFF; Future  -     METABOLIC PANEL, COMPREHENSIVE; Future  -     VITAMIN B12 & FOLATE; Future    2. Acquired hypothyroidism  -     LIPID PANEL; Future  -     TSH 3RD GENERATION; Future  -     T4, FREE; Future    3. Atrophic vaginitis  Dicussed risk and benefit of topical estrogen versus oral  Estrogen in terms fo breast cancer risk (  Much less risk with topical only)-she chooses to stay on the topical estrogen and low doses  4. Postmenopausal  -     DEXA BONE DENSITY STUDY AXIAL; Future    5. Encounter for immunization  -     PNEUMOCOCCAL POLYSACCHARIDE VACCINE, 23-VALENT, ADULT OR IMMUNOSUPPRESSED PT DOSE,    6. GYN exam for high-risk Medicare patient  -     PAP IG, APTIMA HPV AND RFX 16/18,45 ([id number]); Future    .

## 2022-03-03 ENCOUNTER — HOSPITAL ENCOUNTER (OUTPATIENT)
Dept: MAMMOGRAPHY | Age: 67
Discharge: HOME | End: 2022-03-03
Attending: INTERNAL MEDICINE
Payer: MEDICARE

## 2022-03-03 DIAGNOSIS — Z78.0 POSTMENOPAUSAL: ICD-10-CM

## 2022-03-03 PROCEDURE — 77080 DXA BONE DENSITY AXIAL: CPT

## 2022-03-14 NOTE — PROGRESS NOTES
Patient notified of bone density results & need to schedule a visit a visit to discuss med options for the the bone loss. Patient advised to cont her Vit D 2000 units daily. She will call back to schedule her follow up visit.

## 2022-03-14 NOTE — PROGRESS NOTES
Please let her know that her dexa does unfortunately show progression of bone loss in spine and hip  Advise appt in the next few months to discuss her options for medications for this  Cont her vit d daily at least 2000 units

## 2022-03-17 ENCOUNTER — PATIENT MESSAGE (OUTPATIENT)
Dept: INTERNAL MEDICINE CLINIC | Age: 67
End: 2022-03-17

## 2022-03-17 DIAGNOSIS — F41.9 ANXIETY: ICD-10-CM

## 2022-03-17 NOTE — TELEPHONE ENCOUNTER
From: Anita Prakash  To: Blanca Oakes MD  Sent: 3/17/2022 12:16 PM EDT  Subject: Prescription for zanax    Did cJ call for a refill

## 2022-03-18 RX ORDER — ALPRAZOLAM 1 MG/1
TABLET ORAL
Qty: 20 TABLET | Refills: 1 | Status: SHIPPED | OUTPATIENT
Start: 2022-03-18 | End: 2022-10-12 | Stop reason: SDUPTHER

## 2022-03-20 PROBLEM — M85.89 OSTEOPENIA OF MULTIPLE SITES: Status: ACTIVE | Noted: 2018-07-12

## 2022-05-06 DIAGNOSIS — E03.4 HYPOTHYROIDISM DUE TO ACQUIRED ATROPHY OF THYROID: ICD-10-CM

## 2022-05-06 DIAGNOSIS — E03.9 ACQUIRED HYPOTHYROIDISM: ICD-10-CM

## 2022-05-07 RX ORDER — LEVOTHYROXINE SODIUM 88 UG/1
TABLET ORAL
Qty: 90 TABLET | Refills: 2 | Status: SHIPPED | OUTPATIENT
Start: 2022-05-07

## 2022-08-03 ENCOUNTER — OFFICE VISIT (OUTPATIENT)
Dept: INTERNAL MEDICINE CLINIC | Age: 67
End: 2022-08-03
Payer: MEDICARE

## 2022-08-03 VITALS
HEART RATE: 71 BPM | WEIGHT: 152.8 LBS | BODY MASS INDEX: 23.16 KG/M2 | SYSTOLIC BLOOD PRESSURE: 120 MMHG | TEMPERATURE: 97.4 F | RESPIRATION RATE: 16 BRPM | DIASTOLIC BLOOD PRESSURE: 74 MMHG | OXYGEN SATURATION: 98 % | HEIGHT: 68 IN

## 2022-08-03 DIAGNOSIS — F51.02 ADJUSTMENT INSOMNIA: ICD-10-CM

## 2022-08-03 DIAGNOSIS — E03.9 ACQUIRED HYPOTHYROIDISM: Primary | ICD-10-CM

## 2022-08-03 DIAGNOSIS — M81.6 LOCALIZED OSTEOPOROSIS WITHOUT CURRENT PATHOLOGICAL FRACTURE: ICD-10-CM

## 2022-08-03 DIAGNOSIS — M79.7 FIBROMYALGIA: ICD-10-CM

## 2022-08-03 PROCEDURE — G0463 HOSPITAL OUTPT CLINIC VISIT: HCPCS | Performed by: INTERNAL MEDICINE

## 2022-08-03 PROCEDURE — 1101F PT FALLS ASSESS-DOCD LE1/YR: CPT | Performed by: INTERNAL MEDICINE

## 2022-08-03 PROCEDURE — G8420 CALC BMI NORM PARAMETERS: HCPCS | Performed by: INTERNAL MEDICINE

## 2022-08-03 PROCEDURE — G8427 DOCREV CUR MEDS BY ELIG CLIN: HCPCS | Performed by: INTERNAL MEDICINE

## 2022-08-03 PROCEDURE — G8432 DEP SCR NOT DOC, RNG: HCPCS | Performed by: INTERNAL MEDICINE

## 2022-08-03 PROCEDURE — 99214 OFFICE O/P EST MOD 30 MIN: CPT | Performed by: INTERNAL MEDICINE

## 2022-08-03 PROCEDURE — G8536 NO DOC ELDER MAL SCRN: HCPCS | Performed by: INTERNAL MEDICINE

## 2022-08-03 PROCEDURE — 3017F COLORECTAL CA SCREEN DOC REV: CPT | Performed by: INTERNAL MEDICINE

## 2022-08-03 PROCEDURE — 1090F PRES/ABSN URINE INCON ASSESS: CPT | Performed by: INTERNAL MEDICINE

## 2022-08-03 PROCEDURE — G9899 SCRN MAM PERF RSLTS DOC: HCPCS | Performed by: INTERNAL MEDICINE

## 2022-08-03 RX ORDER — ACETAMINOPHEN 500 MG
2000 TABLET ORAL DAILY
COMMUNITY

## 2022-08-03 RX ORDER — DULOXETIN HYDROCHLORIDE 20 MG/1
20 CAPSULE, DELAYED RELEASE ORAL DAILY
Qty: 30 CAPSULE | Refills: 2 | Status: SHIPPED | OUTPATIENT
Start: 2022-08-03 | End: 2022-08-08 | Stop reason: ALTCHOICE

## 2022-08-03 NOTE — PROGRESS NOTES
HISTORY OF PRESENT ILLNESS  Bina Alexander is a 78 y.o. female. HPI  Seen at follow up. Issues:  1. Bone loss. Her bone density showed T score of -2 in left femoral neck, -2.3 in right femoral neck, -1.9 in spine and -2.7 in wrist. This is compared to prior study, showed about a 6% decrease in her hip. Discussed in detail today for at least two years she was much less active and drinking more alcohol and just not as healthy. Now she is back into walking and playing tennis and a healthy diet and would prefer to avoid medications for bone rebuilding, but work on lifestyle. Discussed vitamin D 2,000 units daily. Discussed weightbearing exercise. She will be lifting granddaughters, Franklyn Bella and Alonso, once a week and this is good for her arms. 2. Fibromyalgia. Did flare during the pandemic. Again, overall doing better, but still does endorse regular pain. Discussed options. Will start low dose Cymbalta, both for pain and for help with insomnia, with goal towards minimizing need for Xanax. 3. Thyroid. Describes that at age 22 she had what sounds like radioactive iodine for thyroid nodules. Has been on replacement hormone ever since. Encouraged thyroid ultrasound given the history of iodine exposure. Review of Systems   Constitutional:  Negative for chills, fever and weight loss. Respiratory:  Negative for cough, shortness of breath and wheezing. Cardiovascular:  Negative for chest pain, palpitations, orthopnea, leg swelling and PND. Gastrointestinal:  Negative for heartburn and nausea. Musculoskeletal:  Positive for myalgias. Negative for falls. Neurological:  Negative for dizziness and headaches. Psychiatric/Behavioral:  Negative for depression. The patient is nervous/anxious and has insomnia. Physical Exam  Vitals and nursing note reviewed. Constitutional:       Appearance: She is well-developed. HENT:      Head: Normocephalic and atraumatic.    Neck:      Thyroid: No thyromegaly. Vascular: No carotid bruit. Cardiovascular:      Rate and Rhythm: Normal rate and regular rhythm. Heart sounds: Normal heart sounds, S1 normal and S2 normal. No murmur heard. Pulmonary:      Effort: Pulmonary effort is normal. No respiratory distress. Breath sounds: Normal breath sounds. No wheezing or rales. Musculoskeletal:      Cervical back: Normal range of motion and neck supple. Lymphadenopathy:      Cervical: No cervical adenopathy. Neurological:      Mental Status: She is alert and oriented to person, place, and time. Psychiatric:         Behavior: Behavior normal.       ASSESSMENT and PLAN  Diagnoses and all orders for this visit:    1. Acquired hypothyroidism  -     TSH 3RD GENERATION; Future  -     T4, FREE; Future  -     US THYROID/PARATHYROID/SOFT TISS; Future    2. Localized osteoporosis without current pathological fracture    3. Fibromyalgia  -     DULoxetine (CYMBALTA) 20 mg capsule; Take 1 Capsule by mouth in the morning.     4. Adjustment insomnia      the following changes in treatment are made: start low  dose snri for pain and sleep  Appt in 2 mo  Vit d 2000 units daily

## 2022-08-04 LAB
T4 FREE SERPL-MCNC: 1.2 NG/DL (ref 0.8–1.5)
TSH SERPL DL<=0.05 MIU/L-ACNC: 2.05 UIU/ML (ref 0.36–3.74)

## 2022-08-08 RX ORDER — TRAZODONE HYDROCHLORIDE 50 MG/1
50 TABLET ORAL
Qty: 30 TABLET | Refills: 2 | Status: SHIPPED | OUTPATIENT
Start: 2022-08-08 | End: 2022-10-12 | Stop reason: ALTCHOICE

## 2022-09-28 ENCOUNTER — HOSPITAL ENCOUNTER (OUTPATIENT)
Dept: ULTRASOUND IMAGING | Age: 67
Discharge: HOME | End: 2022-09-28
Attending: INTERNAL MEDICINE
Payer: MEDICARE

## 2022-09-28 DIAGNOSIS — E03.9 ACQUIRED HYPOTHYROIDISM: ICD-10-CM

## 2022-09-28 PROCEDURE — 76536 US EXAM OF HEAD AND NECK: CPT

## 2022-09-29 NOTE — PROGRESS NOTES
Please notify she does have multiple nodules none are worrisome we should follow up with a repeat ultrasound in 1 year

## 2022-10-12 ENCOUNTER — OFFICE VISIT (OUTPATIENT)
Dept: INTERNAL MEDICINE CLINIC | Age: 67
End: 2022-10-12
Payer: MEDICARE

## 2022-10-12 VITALS
TEMPERATURE: 97.8 F | BODY MASS INDEX: 23.19 KG/M2 | RESPIRATION RATE: 16 BRPM | SYSTOLIC BLOOD PRESSURE: 119 MMHG | HEIGHT: 68 IN | DIASTOLIC BLOOD PRESSURE: 77 MMHG | OXYGEN SATURATION: 96 % | HEART RATE: 82 BPM | WEIGHT: 153 LBS

## 2022-10-12 DIAGNOSIS — K59.09 OTHER CONSTIPATION: ICD-10-CM

## 2022-10-12 DIAGNOSIS — F41.9 ANXIETY: ICD-10-CM

## 2022-10-12 DIAGNOSIS — Z00.00 MEDICARE ANNUAL WELLNESS VISIT, INITIAL: Primary | ICD-10-CM

## 2022-10-12 DIAGNOSIS — Z51.81 MEDICATION MONITORING ENCOUNTER: ICD-10-CM

## 2022-10-12 DIAGNOSIS — E03.4 HYPOTHYROIDISM DUE TO ACQUIRED ATROPHY OF THYROID: ICD-10-CM

## 2022-10-12 LAB
AMPHETAMINE QL URINE POC: NEGATIVE
BARBITURATES UR POC: NEGATIVE
BENZODIAZEPINES UR POC: NORMAL
COCAINE QL URINE POC: NEGATIVE
LOT EXP DATE POC: NORMAL
LOT NUMBER POC: NORMAL
MARIJUANA (THC) QL URINE POC: NEGATIVE
MDMA/ECSTASY UR POC: NEGATIVE
METHADONE QL URINE POC: 0
METHAMPHETAMINE QL URINE POC: NEGATIVE
NTI OTHER MICRO TRANSPORT 977598: 0
OPIATES QL URINE POC: NEGATIVE
OXYCODONE UR POC: NEGATIVE
VALID INTERNAL CONTROL?: YES

## 2022-10-12 PROCEDURE — 1101F PT FALLS ASSESS-DOCD LE1/YR: CPT | Performed by: INTERNAL MEDICINE

## 2022-10-12 PROCEDURE — G0438 PPPS, INITIAL VISIT: HCPCS | Performed by: INTERNAL MEDICINE

## 2022-10-12 PROCEDURE — 3017F COLORECTAL CA SCREEN DOC REV: CPT | Performed by: INTERNAL MEDICINE

## 2022-10-12 PROCEDURE — G8399 PT W/DXA RESULTS DOCUMENT: HCPCS | Performed by: INTERNAL MEDICINE

## 2022-10-12 PROCEDURE — G9899 SCRN MAM PERF RSLTS DOC: HCPCS | Performed by: INTERNAL MEDICINE

## 2022-10-12 PROCEDURE — G8420 CALC BMI NORM PARAMETERS: HCPCS | Performed by: INTERNAL MEDICINE

## 2022-10-12 PROCEDURE — 80305 DRUG TEST PRSMV DIR OPT OBS: CPT | Performed by: INTERNAL MEDICINE

## 2022-10-12 PROCEDURE — 1090F PRES/ABSN URINE INCON ASSESS: CPT | Performed by: INTERNAL MEDICINE

## 2022-10-12 PROCEDURE — G8536 NO DOC ELDER MAL SCRN: HCPCS | Performed by: INTERNAL MEDICINE

## 2022-10-12 PROCEDURE — G8432 DEP SCR NOT DOC, RNG: HCPCS | Performed by: INTERNAL MEDICINE

## 2022-10-12 PROCEDURE — G8427 DOCREV CUR MEDS BY ELIG CLIN: HCPCS | Performed by: INTERNAL MEDICINE

## 2022-10-12 PROCEDURE — 99213 OFFICE O/P EST LOW 20 MIN: CPT | Performed by: INTERNAL MEDICINE

## 2022-10-12 PROCEDURE — G0463 HOSPITAL OUTPT CLINIC VISIT: HCPCS | Performed by: INTERNAL MEDICINE

## 2022-10-12 RX ORDER — ALPRAZOLAM 1 MG/1
TABLET ORAL
Qty: 20 TABLET | Refills: 1 | Status: SHIPPED | OUTPATIENT
Start: 2022-10-12

## 2022-10-12 NOTE — PROGRESS NOTES
HISTORY OF PRESENT ILLNESS  Steven Hurtado is a 78 y.o. female. HPI  Seen to discuss medicines, also for wellness review. She has been using Xanax for stress and insomnia. Has used 40 tabs in the last six months and denies any sedation or escalation of use. Urine drug screen today is appropriate. Did not feel Trazodone was helpful. Discussed keep total quantity in this range and should be safe over time. Fibromyalgia really flared during pandemic with stress, seems to be better now. She is more active and enjoying her two year old grandson and they go to [place] regularly. Thyroid, on 88 mcg of Synthroid. No current symptoms of hypo or hyperthyroidism. Preventive Care:  Pap smear February of 2022. Bone density in March 2022. Mammogram every year. Colonoscopy is due. Review of Systems   Constitutional:  Negative for chills, fever and weight loss. HENT:  Positive for hearing loss. Respiratory:  Negative for cough, shortness of breath and wheezing. Cardiovascular:  Negative for chest pain, palpitations, orthopnea, leg swelling and PND. Gastrointestinal:  Negative for abdominal pain, heartburn and nausea. Musculoskeletal:  Negative for falls and myalgias. Neurological:  Negative for dizziness and headaches. Psychiatric/Behavioral:  Negative for depression and memory loss. The patient is nervous/anxious and has insomnia. Physical Exam  Vitals and nursing note reviewed. Constitutional:       Appearance: She is well-developed. HENT:      Head: Normocephalic and atraumatic. Neck:      Thyroid: No thyromegaly. Vascular: No carotid bruit. Cardiovascular:      Rate and Rhythm: Normal rate and regular rhythm. Heart sounds: Normal heart sounds, S1 normal and S2 normal. No murmur heard. Pulmonary:      Effort: Pulmonary effort is normal. No respiratory distress. Breath sounds: Normal breath sounds. No wheezing or rales.    Musculoskeletal:      Cervical back: Normal range of motion and neck supple. Neurological:      Mental Status: She is alert and oriented to person, place, and time. Psychiatric:         Behavior: Behavior normal.       ASSESSMENT and PLAN  Diagnoses and all orders for this visit:    1. Medicare annual wellness visit, initial    2. Medication monitoring encounter  -     AMB POC ALERE ICUP DX DRUG SCREEN 10    3. Other constipation-due for colonoscopy  -     REFERRAL TO GASTROENTEROLOGY    4. Hypothyroidism due to acquired atrophy of thyroid-tsh good in last 3 mo will not  repeat today    5. Anxiety  -     ALPRAZolam (XANAX) 1 mg tablet; TAKE 1/4 TABLET BY MOUTH EVERY NIGHT AT BEDTIME AS NEEDED  This is an Initial Medicare Annual Wellness Exam (AWV) (Performed 12 months after IPPE or effective date of Medicare Part B enrollment, Once in a lifetime)    I have reviewed the patient's medical history in detail and updated the computerized patient record. Assessment/Plan   Education and counseling provided:  Are appropriate based on today's review and evaluation  Influenza Vaccine  Screening Mammography  Screening Pap and pelvic (covered once every 2 years)  Colorectal cancer screening tests  Bone mass measurement (DEXA)  Screening for glaucoma    1. Medicare annual wellness visit, initial  2. Medication monitoring encounter  -     AMB POC ALERE ICUP DX DRUG SCREEN 10  3. Other constipation  -     REFERRAL TO GASTROENTEROLOGY  4. Hypothyroidism due to acquired atrophy of thyroid  5.  Anxiety  -     ALPRAZolam (XANAX) 1 mg tablet; TAKE 1/4 TABLET BY MOUTH EVERY NIGHT AT BEDTIME AS NEEDED, Normal, Disp-20 Tablet, R-1       Depression Risk Factor Screening     3 most recent PHQ Screens 2/15/2022   Little interest or pleasure in doing things Not at all   Feeling down, depressed, irritable, or hopeless Not at all   Total Score PHQ 2 0       Alcohol & Drug Abuse Risk Screen    Do you average more than 1 drink per night or more than 7 drinks a week: No    On any one occasion in the past three months have you have had more than 3 drinks containing alcohol:  No          Functional Ability and Level of Safety    Hearing: Hearing is good. Activities of Daily Living: The home contains: no safety equipment. Patient does total self care     Ambulation: with no difficulty      Fall Risk:  Fall Risk Assessment, last 12 mths 8/3/2022   Able to walk? Yes   Fall in past 12 months? 0   Do you feel unsteady? 0   Are you worried about falling 0      Abuse Screen:  Patient is not abused       Cognitive Screening    Has your family/caregiver stated any concerns about your memory: no     Cognitive Screening: Normal - Verbal Fluency Test    Health Maintenance Due     Health Maintenance Due   Topic Date Due    Medicare Yearly Exam  Never done    Flu Vaccine (1) 08/01/2022       Patient Care Team   Patient Care Team:  Alejandra Albarado MD as PCP - Diana Riddle, Leandra Evans MD as PCP - Michaela Moody Provider    History     Patient Active Problem List   Diagnosis Code    Hypothyroid E03.9    Fibromyalgia M79.7    DVT     Family history of breast cancer Z80.3    S/P colonoscopy Z98.890    Osteopenia of multiple sites M85.89     Past Medical History:   Diagnosis Date    Family history of breast cancer 9/21/2009    Hypothyroid 5/26/2009    S/P colonoscopy 9/21/2009      Past Surgical History:   Procedure Laterality Date    HX COLONOSCOPY  3/2013    HX HEENT      wisdom teeth extraction    HX ORTHOPAEDIC  1/2016    right 3rd finger trigger release    HX TONSILLECTOMY      HX TUBAL LIGATION      VASCULAR SURGERY PROCEDURE UNLIST      insertion of Greenfielf filter     Current Outpatient Medications   Medication Sig Dispense Refill    ALPRAZolam (XANAX) 1 mg tablet TAKE 1/4 TABLET BY MOUTH EVERY NIGHT AT BEDTIME AS NEEDED 20 Tablet 1    cholecalciferol (VITAMIN D3) (2,000 UNITS /50 MCG) cap capsule Take 2,000 Units by mouth in the morning.       Synthroid 88 mcg tablet TAKE 1 TABLET BY MOUTH EVERY MORNING 90 Tablet 2    conjugated estrogens (Premarin) 0.625 mg/gram vaginal cream INSERT 1 GRAM VAGINALLY 2 TIMES A WEEK AS DIRECTED 90 g 1    fexofenadine (ALLEGRA) 180 mg tablet Take 1 Tab by mouth daily for 360 days. (Patient taking differently: Take 180 mg by mouth as needed.) 30 Tab 5     Allergies   Allergen Reactions    Penicillins Hives    Aspirin Anaphylaxis    Azithromycin Hives    Codeine Hives    Coumadin [Warfarin] Rash    Red Dye Swelling     Red dye # 5 - lip swelling     Sulfa (Sulfonamide Antibiotics) Hives       Family History   Problem Relation Age of Onset    Cancer Mother         breast ca 61, copd age 67    Lung Disease Mother     Heart Failure Father         mi age 62    Heart Disease Father     Hypertension Father     Cancer Sister         breast ca 50    Thyroid Disease Sister     Thyroid Disease Brother     Cancer Sister         thyroid cancer    Diabetes Sister         Type 1 DM    No Known Problems Son     No Known Problems Daughter     No Known Problems Son      Social History     Tobacco Use    Smoking status: Never    Smokeless tobacco: Never   Substance Use Topics    Alcohol use:  Yes     Alcohol/week: 0.8 standard drinks     Types: 1 Cans of beer per week     Comment: rarely       Campos Greenwood MD

## 2022-10-12 NOTE — PATIENT INSTRUCTIONS
Gi for colonoscopy   Dr Christa Fleischer Dr Verdis Passe Dr Johnson Simpers Dr Carlyle Sear, Female     The best way to live healthy is to have a lifestyle where you eat a well-balanced diet, exercise regularly, limit alcohol use, and quit all forms of tobacco/nicotine, if applicable. Regular preventive services are another way to keep healthy. Preventive services (vaccines, screening tests, monitoring & exams) can help personalize your care plan, which helps you manage your own care. Screening tests can find health problems at the earliest stages, when they are easiest to treat. [facility] follows the current, evidence-based guidelines published by the [facility] Piyush Ashley ([facility]) when recommending preventive services for our patients. Because we follow these guidelines, sometimes recommendations change over time as research supports it. (For example, mammograms used to be recommended annually. Even though Medicare will still pay for an annual mammogram, the newer guidelines recommend a mammogram every two years for women of average risk). Of course, you and your doctor may decide to screen more often for some diseases, based on your risk and your co-morbidities (chronic disease you are already diagnosed with). Preventive services for you include:  - Medicare offers their members a free annual wellness visit, which is time for you and your primary care provider to discuss and plan for your preventive service needs. Take advantage of this benefit every year!  -All adults over the age of 72 should receive the recommended pneumonia vaccines. Current USPSTF guidelines recommend a series of two vaccines for the best pneumonia protection.   -All adults should have a flu vaccine yearly and a tetanus vaccine every 10 years.   -All adults age 48 and older should receive the shingles vaccines (series of two vaccines).       -All adults age 38-68 who are overweight should have a diabetes screening test once every three years.   -All adults born between [birth year] and 1965 should be screened once for Hepatitis C.  -Other screening tests and preventive services for persons with diabetes include: an eye exam to screen for diabetic retinopathy, a kidney function test, a foot exam, and stricter control over your cholesterol.   -Cardiovascular screening for adults with routine risk involves an electrocardiogram (ECG) at intervals determined by your doctor.   -Colorectal cancer screenings should be done for adults age 54-65 with no increased risk factors for colorectal cancer. There are a number of acceptable methods of screening for this type of cancer. Each test has its own benefits and drawbacks. Discuss with your doctor what is most appropriate for you during your annual wellness visit. The different tests include: colonoscopy (considered the best screening method), a fecal occult blood test, a fecal DNA test, and sigmoidoscopy.    -A bone mass density test is recommended when a woman turns 65 to screen for osteoporosis. This test is only recommended one time, as a screening. Some providers will use this same test as a disease monitoring tool if you already have osteoporosis. -Breast cancer screenings are recommended every other year for women of normal risk, age 54-69.  -Cervical cancer screenings for women over age 72 are only recommended with certain risk factors.      Here is a list of your current Health Maintenance items (your personalized list of preventive services) with a due date:  Health Maintenance Due   Topic Date Due    Annual Well Visit  Never done    Yearly Flu Vaccine (1) 08/01/2022

## 2022-12-06 ENCOUNTER — OFFICE VISIT (OUTPATIENT)
Dept: INTERNAL MEDICINE CLINIC | Age: 67
End: 2022-12-06
Payer: MEDICARE

## 2022-12-06 VITALS
HEART RATE: 85 BPM | WEIGHT: 148.8 LBS | TEMPERATURE: 97.4 F | DIASTOLIC BLOOD PRESSURE: 80 MMHG | RESPIRATION RATE: 16 BRPM | SYSTOLIC BLOOD PRESSURE: 126 MMHG | HEIGHT: 68 IN | BODY MASS INDEX: 22.55 KG/M2 | OXYGEN SATURATION: 98 %

## 2022-12-06 DIAGNOSIS — J06.9 ACUTE URI: Primary | ICD-10-CM

## 2022-12-06 DIAGNOSIS — J01.00 ACUTE NON-RECURRENT MAXILLARY SINUSITIS: ICD-10-CM

## 2022-12-06 PROCEDURE — G8427 DOCREV CUR MEDS BY ELIG CLIN: HCPCS | Performed by: INTERNAL MEDICINE

## 2022-12-06 PROCEDURE — G8420 CALC BMI NORM PARAMETERS: HCPCS | Performed by: INTERNAL MEDICINE

## 2022-12-06 PROCEDURE — 99213 OFFICE O/P EST LOW 20 MIN: CPT | Performed by: INTERNAL MEDICINE

## 2022-12-06 PROCEDURE — 1090F PRES/ABSN URINE INCON ASSESS: CPT | Performed by: INTERNAL MEDICINE

## 2022-12-06 PROCEDURE — G0463 HOSPITAL OUTPT CLINIC VISIT: HCPCS | Performed by: INTERNAL MEDICINE

## 2022-12-06 PROCEDURE — G9899 SCRN MAM PERF RSLTS DOC: HCPCS | Performed by: INTERNAL MEDICINE

## 2022-12-06 PROCEDURE — G8399 PT W/DXA RESULTS DOCUMENT: HCPCS | Performed by: INTERNAL MEDICINE

## 2022-12-06 PROCEDURE — G8432 DEP SCR NOT DOC, RNG: HCPCS | Performed by: INTERNAL MEDICINE

## 2022-12-06 PROCEDURE — G8536 NO DOC ELDER MAL SCRN: HCPCS | Performed by: INTERNAL MEDICINE

## 2022-12-06 PROCEDURE — 1101F PT FALLS ASSESS-DOCD LE1/YR: CPT | Performed by: INTERNAL MEDICINE

## 2022-12-06 PROCEDURE — 3017F COLORECTAL CA SCREEN DOC REV: CPT | Performed by: INTERNAL MEDICINE

## 2022-12-06 RX ORDER — AZITHROMYCIN 250 MG/1
TABLET, FILM COATED ORAL
Qty: 6 TABLET | Refills: 0 | Status: SHIPPED | OUTPATIENT
Start: 2022-12-06

## 2022-12-06 RX ORDER — PREDNISONE 20 MG/1
20 TABLET ORAL
Qty: 5 TABLET | Refills: 0 | Status: SHIPPED | OUTPATIENT
Start: 2022-12-06

## 2022-12-06 NOTE — PROGRESS NOTES
HISTORY OF PRESENT ILLNESS  Fariba Gallegos is a 78 y.o. female. HPI  Worked in the yard no  mask and got congested some chills no fever 11/26-now with more facial pain and green nasal discharge with blood. Neg covid test 3 days ago. Using afrin and saline spray. Flying in 4 days. No  myalgias or sob. Review of Systems   Constitutional: Negative. Negative for chills, diaphoresis, fever and malaise/fatigue. HENT:  Positive for congestion, nosebleeds and sinus pain. Negative for ear discharge, ear pain and sore throat. Eyes:  Negative for pain and discharge. Respiratory:  Negative for cough, hemoptysis, sputum production, shortness of breath and wheezing. Cardiovascular:  Negative for chest pain. Neurological:  Negative for headaches. Endo/Heme/Allergies:  Positive for environmental allergies. Physical Exam  Vitals and nursing note reviewed. Constitutional:       Appearance: She is well-developed. HENT:      Head: Normocephalic. Right Ear: Tympanic membrane, ear canal and external ear normal.      Left Ear: Tympanic membrane, ear canal and external ear normal.      Nose: Nose normal.      Mouth/Throat:      Pharynx: No oropharyngeal exudate. Eyes:      General:         Right eye: No discharge. Left eye: No discharge. Conjunctiva/sclera: Conjunctivae normal.      Pupils: Pupils are equal, round, and reactive to light. Cardiovascular:      Rate and Rhythm: Normal rate and regular rhythm. Heart sounds: Normal heart sounds. Pulmonary:      Effort: Pulmonary effort is normal. No respiratory distress. Breath sounds: Normal breath sounds. No wheezing or rales. Musculoskeletal:      Cervical back: Normal range of motion and neck supple. Lymphadenopathy:      Cervical: No cervical adenopathy. ASSESSMENT and PLAN  Diagnoses and all orders for this visit:    1. Acute URI    2.  Acute non-recurrent maxillary sinusitis  -     azithromycin (ZITHROMAX) 250 mg tablet; Per pack  -     predniSONE (DELTASONE) 20 mg tablet; Take 1 Tablet by mouth daily (with breakfast).   Advised flonase every day and stop the afrin for long term prevention

## 2023-01-11 ENCOUNTER — TELEPHONE (OUTPATIENT)
Dept: INTERNAL MEDICINE CLINIC | Age: 68
End: 2023-01-11

## 2023-01-11 NOTE — TELEPHONE ENCOUNTER
----- Message from Rizwan Cain sent at 1/11/2023  8:33 AM EST -----  Subject: Message to Provider    QUESTIONS  Information for Provider? Pts daughter Kayla Shi is calling in states PT is   currently in the hospital in [facility] for pancreatitis. States the Dr there   (Dr. Clark Burnham) is going to contact the office in regards to the pt. Please   advise.   ---------------------------------------------------------------------------  --------------  Shirley Henley CPLPAULINA  [phone]; OK to leave message on voicemail  ---------------------------------------------------------------------------  --------------  SCRIPT ANSWERS  Relationship to Patient? Other  Representative Name? Kayla Jose Guadalupe  Is the Representative on the appropriate HIPAA document in Epic?  Yes

## 2023-01-18 ENCOUNTER — OFFICE VISIT (OUTPATIENT)
Dept: INTERNAL MEDICINE CLINIC | Age: 68
End: 2023-01-18
Payer: MEDICARE

## 2023-01-18 VITALS
BODY MASS INDEX: 22.55 KG/M2 | HEART RATE: 81 BPM | HEIGHT: 68 IN | TEMPERATURE: 97.6 F | OXYGEN SATURATION: 98 % | WEIGHT: 148.8 LBS | RESPIRATION RATE: 16 BRPM | DIASTOLIC BLOOD PRESSURE: 83 MMHG | SYSTOLIC BLOOD PRESSURE: 135 MMHG

## 2023-01-18 DIAGNOSIS — K85.00 IDIOPATHIC ACUTE PANCREATITIS WITHOUT INFECTION OR NECROSIS: Primary | ICD-10-CM

## 2023-01-18 DIAGNOSIS — Z09 HOSPITAL DISCHARGE FOLLOW-UP: ICD-10-CM

## 2023-01-18 PROCEDURE — G8432 DEP SCR NOT DOC, RNG: HCPCS | Performed by: INTERNAL MEDICINE

## 2023-01-18 PROCEDURE — G8427 DOCREV CUR MEDS BY ELIG CLIN: HCPCS | Performed by: INTERNAL MEDICINE

## 2023-01-18 PROCEDURE — G0463 HOSPITAL OUTPT CLINIC VISIT: HCPCS | Performed by: INTERNAL MEDICINE

## 2023-01-18 PROCEDURE — 99214 OFFICE O/P EST MOD 30 MIN: CPT | Performed by: INTERNAL MEDICINE

## 2023-01-18 PROCEDURE — 1090F PRES/ABSN URINE INCON ASSESS: CPT | Performed by: INTERNAL MEDICINE

## 2023-01-18 PROCEDURE — G8399 PT W/DXA RESULTS DOCUMENT: HCPCS | Performed by: INTERNAL MEDICINE

## 2023-01-18 PROCEDURE — G8420 CALC BMI NORM PARAMETERS: HCPCS | Performed by: INTERNAL MEDICINE

## 2023-01-18 PROCEDURE — 3017F COLORECTAL CA SCREEN DOC REV: CPT | Performed by: INTERNAL MEDICINE

## 2023-01-18 PROCEDURE — 1101F PT FALLS ASSESS-DOCD LE1/YR: CPT | Performed by: INTERNAL MEDICINE

## 2023-01-18 PROCEDURE — 1111F DSCHRG MED/CURRENT MED MERGE: CPT | Performed by: INTERNAL MEDICINE

## 2023-01-18 PROCEDURE — G9899 SCRN MAM PERF RSLTS DOC: HCPCS | Performed by: INTERNAL MEDICINE

## 2023-01-18 PROCEDURE — G8536 NO DOC ELDER MAL SCRN: HCPCS | Performed by: INTERNAL MEDICINE

## 2023-01-18 NOTE — PROGRESS NOTES
HISTORY OF PRESENT ILLNESS  Marie Wang is a 78 y.o. female. HPI  Seen because of acute pancreatitis. She was in [facility]. She notes that she'd had fried grouper and wine the first evening, the next day she had a bourbon and then a drink with dinner and then a fried seafood platter that night. She woke with severe chest pain, sweaty, worried she was having a heart attack, went to the ER and diagnosed with acute pancreatitis. Notes that CT imaging did not show necrosis or evidence of gallbladder disease. She had a CTA, which was negative for PE. She was treated with fluids, discharged from the hospital on January 13th and flew home on January 14th. Currently notes 1-2/10 pain in mid abdomen. No nausea or vomiting, no fevers. She is eating soup and crackers, but scared to eat much more. Review of Systems   Constitutional:  Negative for chills, diaphoresis, fever and malaise/fatigue. Gastrointestinal:  Positive for abdominal pain. Negative for blood in stool, constipation, diarrhea, heartburn, melena, nausea and vomiting. Genitourinary:  Negative for dysuria and flank pain. Physical Exam  Vitals and nursing note reviewed. Constitutional:       Appearance: She is well-developed. HENT:      Head: Normocephalic and atraumatic. Neck:      Thyroid: No thyromegaly. Vascular: No carotid bruit. Cardiovascular:      Rate and Rhythm: Normal rate and regular rhythm. Heart sounds: Normal heart sounds, S1 normal and S2 normal. No murmur heard. Pulmonary:      Effort: Pulmonary effort is normal. No respiratory distress. Breath sounds: Normal breath sounds. No wheezing or rales. Abdominal:      General: There is no distension. Palpations: Abdomen is soft. There is no mass. Tenderness: There is abdominal tenderness (mid abdomen). There is no guarding or rebound. Musculoskeletal:      Cervical back: Normal range of motion and neck supple. Right lower leg: No edema. Left lower leg: No edema. Neurological:      Mental Status: She is alert and oriented to person, place, and time. Psychiatric:         Behavior: Behavior normal.       ASSESSMENT and PLAN  Diagnoses and all orders for this visit:    1. Idiopathic acute pancreatitis without infection or necrosis  -     CT ABD PELV W WO CONT; Future  -     CBC WITH AUTOMATED DIFF; Future  -     LIPID PANEL; Future  -     METABOLIC PANEL, COMPREHENSIVE; Future  -     LIPASE; Future  -     AMYLASE;  Future  Most likely induced by alcohol and fried food during her visit to [place]  Advised no etoh and no fried foods  Appt in 3 weeks  Gradually advance diet

## 2023-01-25 ENCOUNTER — HOSPITAL ENCOUNTER (OUTPATIENT)
Dept: CT IMAGING | Age: 68
Discharge: HOME | End: 2023-01-25
Attending: INTERNAL MEDICINE
Payer: MEDICARE

## 2023-01-25 DIAGNOSIS — K85.00 IDIOPATHIC ACUTE PANCREATITIS WITHOUT INFECTION OR NECROSIS: ICD-10-CM

## 2023-01-25 PROCEDURE — 74011000636 HC RX REV CODE- 636: Performed by: INTERNAL MEDICINE

## 2023-01-25 PROCEDURE — 74177 CT ABD & PELVIS W/CONTRAST: CPT

## 2023-01-25 RX ADMIN — IOPAMIDOL 100 ML: 755 INJECTION, SOLUTION INTRAVENOUS at 07:59

## 2023-01-25 NOTE — PROGRESS NOTES
We discussed her ct scan findings consistent with prior pancreatitis  Cont to advise no etoh and no fried foods  Advised gi appt and see me as planned in feb

## 2023-01-28 DIAGNOSIS — E03.9 ACQUIRED HYPOTHYROIDISM: ICD-10-CM

## 2023-01-28 DIAGNOSIS — E03.4 HYPOTHYROIDISM DUE TO ACQUIRED ATROPHY OF THYROID: ICD-10-CM

## 2023-01-28 RX ORDER — LEVOTHYROXINE SODIUM 88 UG/1
TABLET ORAL
Qty: 90 TABLET | Refills: 2 | Status: SHIPPED | OUTPATIENT
Start: 2023-01-28

## 2023-02-21 ENCOUNTER — OFFICE VISIT (OUTPATIENT)
Dept: INTERNAL MEDICINE CLINIC | Age: 68
End: 2023-02-21
Payer: MEDICARE

## 2023-02-21 VITALS
BODY MASS INDEX: 22.01 KG/M2 | DIASTOLIC BLOOD PRESSURE: 76 MMHG | WEIGHT: 145.2 LBS | SYSTOLIC BLOOD PRESSURE: 114 MMHG | HEART RATE: 67 BPM | RESPIRATION RATE: 16 BRPM | TEMPERATURE: 97.6 F | HEIGHT: 68 IN | OXYGEN SATURATION: 97 %

## 2023-02-21 DIAGNOSIS — Z00.00 MEDICARE ANNUAL WELLNESS VISIT, INITIAL: Primary | ICD-10-CM

## 2023-02-21 DIAGNOSIS — M79.7 FIBROMYALGIA: ICD-10-CM

## 2023-02-21 DIAGNOSIS — Z78.0 POSTMENOPAUSAL: ICD-10-CM

## 2023-02-21 DIAGNOSIS — E03.4 HYPOTHYROIDISM DUE TO ACQUIRED ATROPHY OF THYROID: ICD-10-CM

## 2023-02-21 DIAGNOSIS — J01.11 ACUTE RECURRENT FRONTAL SINUSITIS: ICD-10-CM

## 2023-02-21 DIAGNOSIS — K85.00 IDIOPATHIC ACUTE PANCREATITIS WITHOUT INFECTION OR NECROSIS: ICD-10-CM

## 2023-02-21 PROCEDURE — G0463 HOSPITAL OUTPT CLINIC VISIT: HCPCS | Performed by: INTERNAL MEDICINE

## 2023-02-21 PROCEDURE — G8510 SCR DEP NEG, NO PLAN REQD: HCPCS | Performed by: INTERNAL MEDICINE

## 2023-02-21 PROCEDURE — G8399 PT W/DXA RESULTS DOCUMENT: HCPCS | Performed by: INTERNAL MEDICINE

## 2023-02-21 PROCEDURE — 1101F PT FALLS ASSESS-DOCD LE1/YR: CPT | Performed by: INTERNAL MEDICINE

## 2023-02-21 PROCEDURE — G8427 DOCREV CUR MEDS BY ELIG CLIN: HCPCS | Performed by: INTERNAL MEDICINE

## 2023-02-21 PROCEDURE — 99214 OFFICE O/P EST MOD 30 MIN: CPT | Performed by: INTERNAL MEDICINE

## 2023-02-21 PROCEDURE — G8420 CALC BMI NORM PARAMETERS: HCPCS | Performed by: INTERNAL MEDICINE

## 2023-02-21 PROCEDURE — G9899 SCRN MAM PERF RSLTS DOC: HCPCS | Performed by: INTERNAL MEDICINE

## 2023-02-21 PROCEDURE — 3017F COLORECTAL CA SCREEN DOC REV: CPT | Performed by: INTERNAL MEDICINE

## 2023-02-21 PROCEDURE — G8536 NO DOC ELDER MAL SCRN: HCPCS | Performed by: INTERNAL MEDICINE

## 2023-02-21 PROCEDURE — G0439 PPPS, SUBSEQ VISIT: HCPCS | Performed by: INTERNAL MEDICINE

## 2023-02-21 PROCEDURE — 1090F PRES/ABSN URINE INCON ASSESS: CPT | Performed by: INTERNAL MEDICINE

## 2023-02-21 RX ORDER — AZITHROMYCIN 250 MG/1
TABLET, FILM COATED ORAL
Qty: 6 TABLET | Refills: 0 | Status: SHIPPED | OUTPATIENT
Start: 2023-02-21 | End: 2023-02-22 | Stop reason: SDUPTHER

## 2023-02-21 RX ORDER — CONJUGATED ESTROGENS 0.62 MG/G
CREAM VAGINAL
Qty: 90 G | Refills: 1 | Status: SHIPPED | OUTPATIENT
Start: 2023-02-21

## 2023-02-21 NOTE — PROGRESS NOTES
HISTORY OF PRESENT ILLNESS  Dereck Torres is a 78 y.o. female. HPI  Seen for Medicare wellness visit and follow up on issues:  1. Did have pancreatitis. Overall doing better. Describes 1/10 discomfort after eating. No nausea or vomiting. Being careful with what she eats and has had no wine since our visit in January. Will see Dr. Ron Weir office in March for follow up. 2. Chronic intermittent insomnia. Discussed making Xanax prescription last six months. Currently getting a total of 40 pills for that time period. She uses some CBD as well and encouraged to stay away from alcohol. 3. Recurrent sinusitis with history of nasal polyps. Feels another one starting and she is traveling again next week. Does use Flonase. Wrote for Zithromax and referred to ENT. 4. Thyroid. Remains on 88 mcg with recent good levels. Preventive Care:  Colonoscopy in 2009. Cologuard in 2019. Encouraged colonoscopy with GI. Bone density is ordered. Up to date on mammogram and shots. Review of Systems   Constitutional:  Positive for malaise/fatigue. Negative for chills, fever and weight loss. HENT:  Positive for congestion and sinus pain. Negative for hearing loss and sore throat. Respiratory:  Negative for cough, shortness of breath and wheezing. Cardiovascular:  Negative for chest pain, palpitations, orthopnea, leg swelling and PND. Gastrointestinal:  Negative for abdominal pain, blood in stool, constipation, diarrhea, heartburn and nausea. Genitourinary:  Negative for dysuria. Musculoskeletal:  Negative for falls and myalgias. Neurological:  Negative for dizziness, seizures and headaches. Psychiatric/Behavioral:  Negative for memory loss. Physical Exam  Vitals and nursing note reviewed. Constitutional:       Appearance: She is well-developed. HENT:      Head: Normocephalic and atraumatic.       Right Ear: Tympanic membrane, ear canal and external ear normal.      Left Ear: Tympanic membrane, ear canal and external ear normal.      Nose: Nose normal.      Mouth/Throat:      Pharynx: No oropharyngeal exudate. Eyes:      General:         Right eye: No discharge. Left eye: No discharge. Conjunctiva/sclera: Conjunctivae normal.      Pupils: Pupils are equal, round, and reactive to light. Neck:      Thyroid: No thyromegaly. Vascular: No carotid bruit. Cardiovascular:      Rate and Rhythm: Normal rate and regular rhythm. Heart sounds: Normal heart sounds, S1 normal and S2 normal. No murmur heard. Pulmonary:      Effort: Pulmonary effort is normal. No respiratory distress. Breath sounds: Normal breath sounds. No wheezing or rales. Abdominal:      General: There is no distension. Palpations: Abdomen is soft. There is no mass. Tenderness: There is no abdominal tenderness. Genitourinary:     Comments: Breast exam bilaterally without masses axillary nodes or discharge. BSE reviewed       Musculoskeletal:      Cervical back: Normal range of motion and neck supple. Right lower leg: No edema. Left lower leg: No edema. Lymphadenopathy:      Cervical: No cervical adenopathy. Neurological:      Mental Status: She is alert and oriented to person, place, and time. Psychiatric:         Mood and Affect: Mood normal.         Behavior: Behavior normal.       ASSESSMENT and PLAN  Diagnoses and all orders for this visit:    1. Medicare annual wellness visit, initial    2. Hypothyroidism due to acquired atrophy of thyroid-stable tsh on current dose    3. Idiopathic acute pancreatitis without infection or necrosis-clinically improved  Avoid etoh  See gi as planned in march    4. Fibromyalgia-discussed other options for sleep than xanax  Work on sleep hygiene    5. Postmenopausal  -     DEXA BONE DENSITY STUDY AXIAL; Future  -     conjugated estrogens (Premarin) 0.625 mg/gram vaginal cream; INSERT 1 GRAM VAGINALLY 2 TIMES A WEEK AS DIRECTED    6.  Acute recurrent frontal sinusitis  -     azithromycin (ZITHROMAX) 250 mg tablet; Per pack  -     REFERRAL TO ENT-OTOLARYNGOLOGY  This is the Subsequent Medicare Annual Wellness Exam, performed 12 months or more after the Initial AWV or the last Subsequent AWV    I have reviewed the patient's medical history in detail and updated the computerized patient record. Assessment/Plan   Education and counseling provided:  Are appropriate based on today's review and evaluation  Screening Mammography  Screening Pap and pelvic (covered once every 2 years)  Colorectal cancer screening tests  Bone mass measurement (DEXA)    1. Medicare annual wellness visit, initial  2. Hypothyroidism due to acquired atrophy of thyroid  3. Idiopathic acute pancreatitis without infection or necrosis  4. Fibromyalgia  5. Postmenopausal  -     DEXA BONE DENSITY STUDY AXIAL; Future  -     conjugated estrogens (Premarin) 0.625 mg/gram vaginal cream; INSERT 1 GRAM VAGINALLY 2 TIMES A WEEK AS DIRECTED, Normal, Disp-90 g, R-1  6. Acute recurrent frontal sinusitis  -     azithromycin (ZITHROMAX) 250 mg tablet; Per pack, Normal, Disp-6 Tablet, R-0  -     REFERRAL TO ENT-OTOLARYNGOLOGY       Depression Risk Factor Screening     3 most recent PHQ Screens 2/21/2023   Little interest or pleasure in doing things Not at all   Feeling down, depressed, irritable, or hopeless Not at all   Total Score PHQ 2 0       Alcohol & Drug Abuse Risk Screen   Do you average more than 1 drink per night or more than 7 drinks a week?: (P) No  On any one occasion in the past three months have you had more than 3 drinks containing alcohol?: (P) Yes          Functional Ability and Level of Safety   Hearing:  Hearing: (P) Patient reports hearing is good    Activities of Daily Living:   The home contains: (P) no safety equipment  Functional ADLs: (P) Patient does total self care   Ambulation:  Patient ambulates: (P) with no difficulty   Fall Risk:  Fall Risk Assessment, last 12 mths 2/21/2023   Able to walk? Yes   Fall in past 12 months? 0   Do you feel unsteady? 0   Are you worried about falling 0     Abuse Screen:  Do you ever feel afraid of your partner?: (P) No  Are you in a relationship with someone who physically or mentally threatens you?: (P) No  Is it safe for you to go home?: (P) Yes      Cognitive Screening   Has your family/caregiver stated any concerns about your memory?: (P) No   Cognitive Screening: Normal - Verbal Fluency Test    Health Maintenance Due     Health Maintenance Due   Topic Date Due    Colorectal Cancer Screening Combo  03/15/2023       Patient Care Team   Patient Care Team:  Reagan Bell MD as PCP - Awais Chavis MD as PCP - Michaela Moody Provider    History     Patient Active Problem List   Diagnosis Code    Hypothyroid E03.9    Fibromyalgia M79.7    DVT     Family history of breast cancer Z80.3    S/P colonoscopy Z98.890    Osteopenia of multiple sites M85.89     Past Medical History:   Diagnosis Date    Family history of breast cancer 9/21/2009    Hypothyroid 5/26/2009    S/P colonoscopy 9/21/2009      Past Surgical History:   Procedure Laterality Date    HX COLONOSCOPY  3/2013    HX HEENT      wisdom teeth extraction    HX ORTHOPAEDIC  1/2016    right 3rd finger trigger release    HX TONSILLECTOMY      HX TUBAL LIGATION      CO UNLISTED PROCEDURE VASCULAR SURGERY      insertion of Greenfielf filter     Current Outpatient Medications   Medication Sig Dispense Refill    conjugated estrogens (Premarin) 0.625 mg/gram vaginal cream INSERT 1 GRAM VAGINALLY 2 TIMES A WEEK AS DIRECTED 90 g 1    azithromycin (ZITHROMAX) 250 mg tablet Per pack 6 Tablet 0    Synthroid 88 mcg tablet TAKE 1 TABLET BY MOUTH EVERY MORNING 90 Tablet 2    ALPRAZolam (XANAX) 1 mg tablet TAKE 1/4 TABLET BY MOUTH EVERY NIGHT AT BEDTIME AS NEEDED 20 Tablet 1    cholecalciferol (VITAMIN D3) (2,000 UNITS /50 MCG) cap capsule Take 2,000 Units by mouth in the morning. fexofenadine (ALLEGRA) 180 mg tablet Take 1 Tab by mouth daily for 360 days.  (Patient taking differently: Take 180 mg by mouth as needed.) 30 Tab 5     Allergies   Allergen Reactions    Penicillins Hives    Aspirin Anaphylaxis    Codeine Hives    Coumadin [Warfarin] Rash    Red Dye Swelling     Red dye # 5 - lip swelling     Sulfa (Sulfonamide Antibiotics) Hives       Family History   Problem Relation Age of Onset    Cancer Mother         breast ca 61, copd age 67    Lung Disease Mother     Heart Failure Father         mi age 62    Heart Disease Father     Hypertension Father     Cancer Sister         breast ca 50    Thyroid Disease Sister     Thyroid Disease Brother     Cancer Sister         thyroid cancer    Diabetes Sister         Type 1 DM    No Known Problems Son     No Known Problems Daughter     No Known Problems Son      Social History     Tobacco Use    Smoking status: Never    Smokeless tobacco: Never   Substance Use Topics    Alcohol use: Not Currently     Alcohol/week: 0.8 standard drinks     Types: 1 Cans of beer per week     Comment: rarely         Edilson Miguel MD

## 2023-02-21 NOTE — PATIENT INSTRUCTIONS
Medicare Wellness Visit, Female     The best way to live healthy is to have a lifestyle where you eat a well-balanced diet, exercise regularly, limit alcohol use, and quit all forms of tobacco/nicotine, if applicable. Regular preventive services are another way to keep healthy. Preventive services (vaccines, screening tests, monitoring & exams) can help personalize your care plan, which helps you manage your own care. Screening tests can find health problems at the earliest stages, when they are easiest to treat. [facility] follows the current, evidence-based guidelines published by the [facility] ([facility]) when recommending preventive services for our patients. Because we follow these guidelines, sometimes recommendations change over time as research supports it. (For example, mammograms used to be recommended annually. Even though Medicare will still pay for an annual mammogram, the newer guidelines recommend a mammogram every two years for women of average risk). Of course, you and your doctor may decide to screen more often for some diseases, based on your risk and your co-morbidities (chronic disease you are already diagnosed with). Preventive services for you include:  - Medicare offers their members a free annual wellness visit, which is time for you and your primary care provider to discuss and plan for your preventive service needs.  Take advantage of this benefit every year!    -Over the age of 72 should receive the recommended pneumonia vaccines.    -All adults should have a flu vaccine yearly.  -All adults should have a tetanus vaccine every 10 years.   -Over the age 48 should receive the shingles vaccines.        -All adults should be screened once for Hepatitis C.  -All adults age 38-68 who are overweight should have a diabetes screening test once every three years.   -Other screening tests and preventive services for persons with diabetes include: an eye exam to screen for diabetic retinopathy, a kidney function test, a foot exam, and stricter control over your cholesterol.   -Cardiovascular screening for adults with routine risk involves an electrocardiogram (ECG) at intervals determined by your doctor.     -Colorectal cancer screenings should be done for adults age 39-70 with no increased risk factors for colorectal cancer. There are a number of acceptable methods of screening for this type of cancer. Each test has its own benefits and drawbacks. Discuss with your doctor what is most appropriate for you during your annual wellness visit. The different tests include: colonoscopy (considered the best screening method), a fecal occult blood test, a fecal DNA test, and sigmoidoscopy.    -Lung cancer screening is recommended annually with a low dose CT scan for adults between age 54 and 68, who have smoked at least 30 pack years (equivalent of 1 pack per day for 30 days), and who is a current smoker or quit less than 15 years ago.    -A bone mass density test is recommended when a woman turns 65 to screen for osteoporosis. This test is only recommended one time, as a screening. Some providers will use this same test as a disease monitoring tool if you already have osteoporosis. -Breast cancer screenings are recommended every other year for women of normal risk, age 54-69.    -Cervical cancer screenings for women over age 72 are only recommended with certain risk factors.      Here is a list of your current Health Maintenance items (your personalized list of preventive services) with a due date:  Health Maintenance Due   Topic Date Due    Colorectal Screening  03/15/2023

## 2023-02-22 DIAGNOSIS — J01.11 ACUTE RECURRENT FRONTAL SINUSITIS: ICD-10-CM

## 2023-02-22 RX ORDER — AZITHROMYCIN 250 MG/1
TABLET, FILM COATED ORAL
Qty: 6 TABLET | Refills: 0 | Status: SHIPPED | OUTPATIENT
Start: 2023-02-22

## 2023-02-22 NOTE — PROGRESS NOTES
Subjective:   65 y.o. female for Well Woman Check. Her pap was normal last year  mammo due in 8/17  Colonoscopy done in 5/13  Dt in town and son in town and son moving to [address] [place] [address]  She retired last year and is busy with exercise and caring for son's puppies    Patient Active Problem List    Diagnosis Date Noted    Family history of breast cancer 09/21/2009    S/P colonoscopy 09/21/2009    Sjogren's disease ([facility] [address] 05/26/2009    Hypothyroid 05/26/2009    Fibromyalgia 05/26/2009    DVT 05/26/2009     Current Outpatient Prescriptions   Medication Sig Dispense Refill    conjugated estrogens (PREMARIN) 0.625 mg/gram vaginal cream INSERT 1 GRAM VAGINALLY TWICE WEEKLY AS DIRECTED 30 g 5    SYNTHROID 88 mcg tablet TAKE 1 TABLET BY MOUTH EVERY DAY 30 Tab 11    ALPRAZolam (XANAX) 1 mg tablet Take 1 Tab by mouth nightly as needed for Anxiety. Max Daily Amount: 1 mg. 25 Tab 0    fexofenadine (ALLEGRA) 180 mg tablet Take 1 Tab by mouth daily for 360 days.  (Patient taking differently: Take 180 mg by mouth as needed.) 30 Tab 5     Allergies   Allergen Reactions    Aspirin Anaphylaxis    Azithromycin Hives    Codeine Hives    Coumadin [Warfarin] Rash    Red Dye Swelling     Red dye # 5 - lip swelling     Sulfa (Sulfonamide Antibiotics) Hives     Past Medical History:   Diagnosis Date    Family history of breast cancer 9/21/2009    Hypothyroid 5/26/2009    S/P colonoscopy 9/21/2009    Sjogren's disease ([facility] [address] 5/26/2009     Past Surgical History:   Procedure Laterality Date    HX COLONOSCOPY  3/2013    HX HEENT      wisdom teeth extraction    HX ORTHOPAEDIC  1/2016    right 3rd finger trigger release    HX TONSILLECTOMY      HX TUBAL LIGATION       Family History   Problem Relation Age of Onset    Cancer Mother      breast ca 61, copd age 67    Heart Failure Father      mi age 62    Heart Disease Father     Hypertension Father     Cancer Sister      breast ca 50    Thyroid Disease Sister     Thyroid Disease Brother     Cancer Sister      thyroid cancer    Diabetes Sister      Type 1 DM    No Known Problems Son     No Known Problems Daughter     No Known Problems Son      Social History   Substance Use Topics    Smoking status: Never Smoker    Smokeless tobacco: Never Used    Alcohol use 0.5 oz/week     1 Cans of beer per week             ROS: Feeling generally well. No TIA's or unusual headaches, no dysphagia. No prolonged cough. No dyspnea or chest pain on exertion. No abdominal pain, change in bowel habits, black or bloody stools. No urinary tract symptoms. No new or unusual musculoskeletal symptoms. Specific concerns today: none needs xanax for trip to [place]. Objective: The patient appears well, alert, oriented x 3, in no distress. Visit Vitals    /70 (BP 1 Location: Left arm, BP Patient Position: Sitting)    Pulse 71    Temp 98.5 °F (36.9 °C) (Oral)    Resp 16    Ht 5' 8\" (1.727 m)  Comment: per chart    Wt 156 lb 9.6 oz (71 kg)    LMP 02/05/2007    SpO2 98%    BMI 23.81 kg/m2     ENT normal.  Neck supple. No adenopathy or thyromegaly. SHABBIR. Lungs are clear, good air entry, no wheezes, rhonchi or rales. S1 and S2 normal, no murmurs, regular rate and rhythm. Abdomen soft without tenderness, guarding, mass or organomegaly. Extremities show no edema, normal peripheral pulses. Neurological is normal, no focal findings.   Breast no masses axillary nodes or discharge, good rom of knees and no pain with rom    Assessment/Plan:   Well Woman  continue present plan  Saman Riddle was seen today for complete physical.    Diagnoses and all orders for this visit:    Well woman exam (no gynecological exam)  -     HEPATITIS C AB  -     CBC WITH AUTOMATED DIFF  -     METABOLIC PANEL, COMPREHENSIVE  -     LIPID PANEL  -     TSH 3RD GENERATION  -     T4, FREE  -     VITAMIN D, 25 HYDROXY    Hypothyroidism due to acquired atrophy of thyroid  -     SYNTHROID 88 mcg tablet; TAKE 1 TABLET BY MOUTH EVERY DAY    Postmenopause  -     conjugated estrogens (PREMARIN) 0.625 mg/gram vaginal cream; INSERT 1 GRAM VAGINALLY TWICE WEEKLY AS DIRECTED    Anxiety  -     ALPRAZolam (XANAX) 1 mg tablet; Take 1 Tab by mouth nightly as needed for Anxiety. Max Daily Amount: 1 mg.     Travel advice encounter Per daughter pt recently has declined in his ability to take care of himself and he has a significant history of PHILL.

## 2023-03-13 ENCOUNTER — HOSPITAL ENCOUNTER (OUTPATIENT)
Dept: BONE DENSITY | Age: 68
Discharge: HOME | End: 2023-03-13
Attending: INTERNAL MEDICINE
Payer: MEDICARE

## 2023-03-13 DIAGNOSIS — Z78.0 POSTMENOPAUSAL: ICD-10-CM

## 2023-03-13 PROCEDURE — 77080 DXA BONE DENSITY AXIAL: CPT

## 2023-03-14 ENCOUNTER — TRANSCRIBE ORDER (OUTPATIENT)
Dept: SCHEDULING | Age: 68
End: 2023-03-14

## 2023-03-14 DIAGNOSIS — Z12.11 ENCOUNTER FOR SCREENING COLONOSCOPY: ICD-10-CM

## 2023-03-14 DIAGNOSIS — K86.89 ACUTE PANCREATIC FLUID COLLECTION: Primary | ICD-10-CM

## 2023-04-02 ENCOUNTER — HOSPITAL ENCOUNTER (OUTPATIENT)
Dept: MRI IMAGING | Age: 68
Discharge: HOME | End: 2023-04-02
Attending: NURSE PRACTITIONER
Payer: MEDICARE

## 2023-04-02 DIAGNOSIS — Z12.11 ENCOUNTER FOR SCREENING COLONOSCOPY: ICD-10-CM

## 2023-04-02 DIAGNOSIS — K86.89 ACUTE PANCREATIC FLUID COLLECTION: ICD-10-CM

## 2023-04-02 PROCEDURE — 77030021566

## 2023-04-02 PROCEDURE — A9576 INJ PROHANCE MULTIPACK: HCPCS | Performed by: RADIOLOGY

## 2023-04-02 PROCEDURE — 74183 MRI ABD W/O CNTR FLWD CNTR: CPT

## 2023-04-02 PROCEDURE — 74011250636 HC RX REV CODE- 250/636: Performed by: RADIOLOGY

## 2023-04-02 RX ADMIN — GADOTERIDOL 13 ML: 279.3 INJECTION, SOLUTION INTRAVENOUS at 09:45

## 2023-04-12 PROBLEM — K85.90 PANCREATITIS: Status: ACTIVE | Noted: 2023-04-12

## 2023-08-04 ENCOUNTER — TELEMEDICINE (OUTPATIENT)
Age: 68
End: 2023-08-04
Payer: MEDICARE

## 2023-08-04 DIAGNOSIS — Z95.828 PRESENCE OF IVC FILTER: ICD-10-CM

## 2023-08-04 DIAGNOSIS — J40 BRONCHITIS: Primary | ICD-10-CM

## 2023-08-04 DIAGNOSIS — Q45.3 PANCREAS DIVISUM, NATIVE: ICD-10-CM

## 2023-08-04 PROCEDURE — 99213 OFFICE O/P EST LOW 20 MIN: CPT | Performed by: INTERNAL MEDICINE

## 2023-08-04 PROCEDURE — 1123F ACP DISCUSS/DSCN MKR DOCD: CPT | Performed by: INTERNAL MEDICINE

## 2023-08-04 RX ORDER — PREDNISONE 20 MG/1
40 TABLET ORAL DAILY
Qty: 10 TABLET | Refills: 0 | Status: SHIPPED | OUTPATIENT
Start: 2023-08-04 | End: 2023-08-09

## 2023-08-04 RX ORDER — AZITHROMYCIN 250 MG/1
TABLET, FILM COATED ORAL
Qty: 1 PACKET | Refills: 0 | Status: SHIPPED | OUTPATIENT
Start: 2023-08-04 | End: 2023-08-09

## 2023-08-04 RX ORDER — BENZONATATE 100 MG/1
100 CAPSULE ORAL 3 TIMES DAILY PRN
Qty: 30 CAPSULE | Refills: 0 | Status: SHIPPED | OUTPATIENT
Start: 2023-08-04 | End: 2023-08-14

## 2023-08-04 NOTE — PROGRESS NOTES
Mykel Benites (:  1955) is a Established patient, presenting virtually for evaluation of the following:    Assessment & Plan   Below is the assessment and plan developed based on review of pertinent history, physical exam, labs, studies, and medications. 1. Bronchitis-secondary to viral illness 2 weeks ago and not improviing with progressive cough  Discussed I would postpone the colonoscopy next week if not significantly better in 5 days. Appt  if worsening  fluids  -     azithromycin (ZITHROMAX) 250 MG tablet; Take as directed (2 tabs PO now and then 1 tab PO daily for 4 days), Disp-1 packet, R-0Normal  -     predniSONE (DELTASONE) 20 MG tablet; Take 2 tablets by mouth daily for 5 days, Disp-10 tablet, R-0Normal  -     benzonatate (TESSALON) 100 MG capsule; Take 1 capsule by mouth 3 times daily as needed for Cough, Disp-30 capsule, R-0Normal  2. Pancreas divisum, native  3. Presence of IVC filter    No follow-ups on file. Subjective   HPI  2 weeks ago after caring for her grandchildren she developed a cold with cough and congestion. It is now in her lungs and feels in chest with heaviness and productive cough -thick mucus no blood. No fevers documented. Some sob able to walk up stairs. Has had pneumonia in past does not feel like that. Has ho pancreatitis no current gi sxs-due for colonoscopy this coming week on Thursday.    Review of Systems       Objective   Patient-Reported Vitals  Patient-Reported Weight: 134lb       Physical Exam  [INSTRUCTIONS:  \"[x]\" Indicates a positive item  \"[]\" Indicates a negative item  -- DELETE ALL ITEMS NOT EXAMINED]    Constitutional: [x] Appears well-developed and well-nourished [x] No apparent distress      [] Abnormal -     Mental status: [x] Alert and awake  [x] Oriented to person/place/time [x] Able to follow commands    [] Abnormal -     Eyes:   EOM    [x]  Normal    [] Abnormal -   Sclera  [x]  Normal    [] Abnormal -          Discharge [x]  None visible   []

## 2023-08-10 ENCOUNTER — ANESTHESIA EVENT (OUTPATIENT)
Facility: HOSPITAL | Age: 68
End: 2023-08-10
Payer: MEDICARE

## 2023-08-10 ENCOUNTER — ANESTHESIA (OUTPATIENT)
Facility: HOSPITAL | Age: 68
End: 2023-08-10
Payer: MEDICARE

## 2023-08-10 ENCOUNTER — HOSPITAL ENCOUNTER (OUTPATIENT)
Facility: HOSPITAL | Age: 68
Setting detail: OUTPATIENT SURGERY
Discharge: HOME | End: 2023-08-10
Attending: INTERNAL MEDICINE | Admitting: INTERNAL MEDICINE
Payer: MEDICARE

## 2023-08-10 VITALS
DIASTOLIC BLOOD PRESSURE: 70 MMHG | BODY MASS INDEX: 20.98 KG/M2 | RESPIRATION RATE: 13 BRPM | TEMPERATURE: 97.5 F | SYSTOLIC BLOOD PRESSURE: 104 MMHG | WEIGHT: 138 LBS | OXYGEN SATURATION: 99 % | HEART RATE: 64 BPM

## 2023-08-10 PROCEDURE — 6360000002 HC RX W HCPCS: Performed by: ANESTHESIOLOGY

## 2023-08-10 PROCEDURE — 3700000000 HC ANESTHESIA ATTENDED CARE: Performed by: INTERNAL MEDICINE

## 2023-08-10 PROCEDURE — 7100000011 HC PHASE II RECOVERY - ADDTL 15 MIN: Performed by: INTERNAL MEDICINE

## 2023-08-10 PROCEDURE — 3700000001 HC ADD 15 MINUTES (ANESTHESIA): Performed by: INTERNAL MEDICINE

## 2023-08-10 PROCEDURE — 3600007502: Performed by: INTERNAL MEDICINE

## 2023-08-10 PROCEDURE — 2500000003 HC RX 250 WO HCPCS: Performed by: ANESTHESIOLOGY

## 2023-08-10 PROCEDURE — 3600007512: Performed by: INTERNAL MEDICINE

## 2023-08-10 PROCEDURE — 6370000000 HC RX 637 (ALT 250 FOR IP): Performed by: INTERNAL MEDICINE

## 2023-08-10 PROCEDURE — 2580000003 HC RX 258: Performed by: ANESTHESIOLOGY

## 2023-08-10 PROCEDURE — 7100000010 HC PHASE II RECOVERY - FIRST 15 MIN: Performed by: INTERNAL MEDICINE

## 2023-08-10 RX ORDER — SIMETHICONE 20 MG/.3ML
EMULSION ORAL
Status: DISCONTINUED
Start: 2023-08-10 | End: 2023-08-10 | Stop reason: HOSPADM

## 2023-08-10 RX ORDER — SODIUM CHLORIDE 0.9 % (FLUSH) 0.9 %
5-40 SYRINGE (ML) INJECTION EVERY 12 HOURS SCHEDULED
Status: DISCONTINUED | OUTPATIENT
Start: 2023-08-10 | End: 2023-08-10 | Stop reason: HOSPADM

## 2023-08-10 RX ORDER — SODIUM CHLORIDE 9 MG/ML
25 INJECTION, SOLUTION INTRAVENOUS PRN
Status: DISCONTINUED | OUTPATIENT
Start: 2023-08-10 | End: 2023-08-10 | Stop reason: HOSPADM

## 2023-08-10 RX ORDER — SODIUM CHLORIDE 9 MG/ML
INJECTION, SOLUTION INTRAVENOUS CONTINUOUS PRN
Status: DISCONTINUED | OUTPATIENT
Start: 2023-08-10 | End: 2023-08-10 | Stop reason: SDUPTHER

## 2023-08-10 RX ORDER — SODIUM CHLORIDE 0.9 % (FLUSH) 0.9 %
5-40 SYRINGE (ML) INJECTION PRN
Status: DISCONTINUED | OUTPATIENT
Start: 2023-08-10 | End: 2023-08-10 | Stop reason: HOSPADM

## 2023-08-10 RX ORDER — SIMETHICONE 20 MG/.3ML
EMULSION ORAL PRN
Status: DISCONTINUED | OUTPATIENT
Start: 2023-08-10 | End: 2023-08-10 | Stop reason: ALTCHOICE

## 2023-08-10 RX ORDER — SODIUM CHLORIDE 9 MG/ML
INJECTION, SOLUTION INTRAVENOUS CONTINUOUS
Status: DISCONTINUED | OUTPATIENT
Start: 2023-08-10 | End: 2023-08-10 | Stop reason: HOSPADM

## 2023-08-10 RX ORDER — LIDOCAINE HYDROCHLORIDE 20 MG/ML
INJECTION, SOLUTION EPIDURAL; INFILTRATION; INTRACAUDAL; PERINEURAL PRN
Status: DISCONTINUED | OUTPATIENT
Start: 2023-08-10 | End: 2023-08-10 | Stop reason: SDUPTHER

## 2023-08-10 RX ADMIN — PROPOFOL 25 MG: 10 INJECTION, EMULSION INTRAVENOUS at 10:19

## 2023-08-10 RX ADMIN — PROPOFOL 25 MG: 10 INJECTION, EMULSION INTRAVENOUS at 10:31

## 2023-08-10 RX ADMIN — PROPOFOL 50 MG: 10 INJECTION, EMULSION INTRAVENOUS at 10:15

## 2023-08-10 RX ADMIN — LIDOCAINE HYDROCHLORIDE 50 MG: 20 INJECTION, SOLUTION EPIDURAL; INFILTRATION; INTRACAUDAL; PERINEURAL at 10:15

## 2023-08-10 RX ADMIN — PROPOFOL 25 MG: 10 INJECTION, EMULSION INTRAVENOUS at 10:29

## 2023-08-10 RX ADMIN — PROPOFOL 25 MG: 10 INJECTION, EMULSION INTRAVENOUS at 10:21

## 2023-08-10 RX ADMIN — PROPOFOL 25 MG: 10 INJECTION, EMULSION INTRAVENOUS at 10:23

## 2023-08-10 RX ADMIN — SODIUM CHLORIDE: 9 INJECTION, SOLUTION INTRAVENOUS at 10:14

## 2023-08-10 RX ADMIN — PROPOFOL 50 MG: 10 INJECTION, EMULSION INTRAVENOUS at 10:17

## 2023-08-10 RX ADMIN — PROPOFOL 25 MG: 10 INJECTION, EMULSION INTRAVENOUS at 10:25

## 2023-08-10 RX ADMIN — PROPOFOL 25 MG: 10 INJECTION, EMULSION INTRAVENOUS at 10:27

## 2023-08-10 ASSESSMENT — PAIN - FUNCTIONAL ASSESSMENT: PAIN_FUNCTIONAL_ASSESSMENT: NONE - DENIES PAIN

## 2023-08-10 NOTE — H&P
[address]  [phone]                                History and Physical     NAME: Melyssa Goldman   :  1955   MRN:  [id number]     HPI:  The patient was seen and examined. 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, congenital abn pancreas    Presence of IVC filter 2023    S/P colonoscopy 2009     Social History     Tobacco Use    Smoking status: Never    Smokeless tobacco: Never   Vaping Use    Vaping Use: Never used   Substance Use Topics    Alcohol use: Not Currently     Alcohol/week: 0.8 standard drinks    Drug use: No     Allergies   Allergen Reactions    Aspirin Anaphylaxis    Penicillins Hives    Codeine Hives    Red Dye Swelling     Red dye # 5 - lip swelling     Sulfa Antibiotics Hives    Warfarin Rash     Family History   Problem Relation Age of Onset    Cancer Mother         breast ca 61, copd age 67    Lung Disease Mother     Heart Failure Father         mi age 62    Heart Disease Father     Hypertension Father     Cancer Sister         breast ca 50    No Known Problems Son     Thyroid Disease Brother     Cancer Sister         thyroid cancer    Diabetes Sister         Type 1 DM    No Known Problems Son     No Known Problems Daughter     Thyroid Disease Sister      No current facility-administered medications for this encounter. PHYSICAL EXAM:  General: WD, WN.  Alert, cooperative, no acute distress    HEENT: NC,

## 2023-08-10 NOTE — DISCHARGE INSTRUCTIONS
[address], [address] [place] [address] Drive  [id number]  1955    It was my pleasure seeing you for your procedure. You will also receive a summary report with the findings from this procedure and any further recommendations. If you had polyps removed or biopsies taken during your procedure, you will receive a separate letter from me within approximately the next 2 weeks. If you don't receive this letter or if you have any questions, please call my office [phone]. Please take note of the post procedure instructions listed below. Gal Wishes,    Dr. Francia Thornton    These instructions give you information on caring for yourself after your procedure. Call your doctor if you have any problems or questions after your procedure. HOME CARE  Walk if you have belly cramping or gas. Walking will help get rid of the air and reduce the bloated feeling in your belly (abdomen). Your IV site (where you received drugs) may be tender to touch. Place warm towels on the site; keep your arm up on two pillows if you have any swelling or soreness in the area. You may shower. ACTIVITY:  Take frequent rest periods and move at a slower pace for the next 24 hours. .  You may resume your regular activity tomorrow if you are feeling back to normal.  Do not drive or ride a bicycle for at least 24 hours (because of the medicine (anesthesia) used during the test). Do not sign any important legal documents or use or operate any machinery for 24 hours  Do not take sleeping medicines/nerve drugs for 24 hours unless the doctor tells you. You can return to work/school tomorrow unless otherwise instructed. NUTRITION:  Drink plenty of fluids to keep your pee (urine) clear or pale yellow  Begin with a light meal and progress to your normal diet.  Heavy or fried foods are harder to digest and may make

## 2023-08-10 NOTE — OP NOTE
[address] [place]  [phone]                              Colonoscopy Procedure Note      Indications:   Screening colonoscopy      :  Jhonathan Sanon MD    Staff: Circulator: Sayra Mallory RN  Endoscopy Technician: Spencer Cottrell    Referring Provider: Shu Chan MD    Sedation:  MAC anesthesia    Procedure Details:  After informed consent was obtained with all risks and benefits of procedure explained and preoperative exam completed, the patient was taken to the endoscopy suite and placed in the left lateral decubitus position. Upon sequential sedation as per above, a digital rectal exam was performed per below. The Olympus videocolonoscope was inserted in the rectum and carefully advanced to the terminal ileum. The quality of preparation was good. Aguila Bowel Prep Score : 3/3/3. The colonoscope was slowly withdrawn with careful evaluation between folds. Retroflexion in the rectum was performed. Findings:   Rectum: small internal hemorrhoids  Sigmoid: mild diverticulosis   Descending Colon:  mild diverticulosis   Transverse Colon: normal  Ascending Colon: normal  Cecum: normal  Terminal Ileum: normal    Interventions:  none    Specimen Removed:  * No specimens in log *    Complications: None. EBL:  none    Impression:    See Postoperative diagnosis above    Recommendations:   - Resume normal medications.  - Recommend repeat colonoscopy in 10 years. Discharge Disposition:  Home in the company of a  when able to ambulate.     Jhonathan Sanon MD  8/10/2023  10:39 AM

## 2023-08-10 NOTE — PROGRESS NOTES

## 2023-08-10 NOTE — ANESTHESIA POSTPROCEDURE EVALUATION
Department of Anesthesiology  Postprocedure Note    Patient:  Minda Jay  MRN: [id number]  YOB: 1955  Date of evaluation: 8/10/2023      Procedure Summary     Date: 08/10/23 Room / Location: [facility] ENDO  / [facility] ENDOSCOPY    Anesthesia Start: 1014 Anesthesia Stop: 1034    Procedure: COLONOSCOPY Diagnosis:       Acute pancreatic fluid collection      Special screening for malignant neoplasms, colon      (Acute pancreatic fluid collection [K86.89])      (Special screening for malignant neoplasms, colon [Z12.11])    Surgeons: Royal Schwartz MD Responsible Provider: Mark Anthony Jean-Baptiste MD    Anesthesia Type: MAC ASA Status: 2          Anesthesia Type: MAC    Brooklynn Phase I: Brooklynn Score: 10    Brooklynn Phase II: Brooklynn Score: 9      Anesthesia Post Evaluation    Patient location during evaluation: PACU  Patient participation: complete - patient participated  Level of consciousness: awake  Airway patency: patent  Nausea & Vomiting: no nausea  Complications: no  Cardiovascular status: blood pressure returned to baseline and hemodynamically stable  Respiratory status: acceptable  Hydration status: stable  Multimodal analgesia pain management approach

## 2023-10-14 SDOH — ECONOMIC STABILITY: INCOME INSECURITY: HOW HARD IS IT FOR YOU TO PAY FOR THE VERY BASICS LIKE FOOD, HOUSING, MEDICAL CARE, AND HEATING?: NOT HARD AT ALL

## 2023-10-14 SDOH — ECONOMIC STABILITY: HOUSING INSECURITY
IN THE LAST 12 MONTHS, WAS THERE A TIME WHEN YOU DID NOT HAVE A STEADY PLACE TO SLEEP OR SLEPT IN A SHELTER (INCLUDING NOW)?: NO

## 2023-10-14 SDOH — ECONOMIC STABILITY: FOOD INSECURITY: WITHIN THE PAST 12 MONTHS, YOU WORRIED THAT YOUR FOOD WOULD RUN OUT BEFORE YOU GOT MONEY TO BUY MORE.: NEVER TRUE

## 2023-10-14 SDOH — ECONOMIC STABILITY: FOOD INSECURITY: WITHIN THE PAST 12 MONTHS, THE FOOD YOU BOUGHT JUST DIDN'T LAST AND YOU DIDN'T HAVE MONEY TO GET MORE.: NEVER TRUE

## 2023-10-14 SDOH — ECONOMIC STABILITY: TRANSPORTATION INSECURITY
IN THE PAST 12 MONTHS, HAS LACK OF TRANSPORTATION KEPT YOU FROM MEETINGS, WORK, OR FROM GETTING THINGS NEEDED FOR DAILY LIVING?: NO

## 2023-10-17 ENCOUNTER — OFFICE VISIT (OUTPATIENT)
Age: 68
End: 2023-10-17
Payer: MEDICARE

## 2023-10-17 VITALS
TEMPERATURE: 97.9 F | SYSTOLIC BLOOD PRESSURE: 120 MMHG | HEIGHT: 68 IN | RESPIRATION RATE: 16 BRPM | WEIGHT: 144 LBS | BODY MASS INDEX: 21.82 KG/M2 | HEART RATE: 75 BPM | OXYGEN SATURATION: 99 % | DIASTOLIC BLOOD PRESSURE: 82 MMHG

## 2023-10-17 DIAGNOSIS — E53.8 LOW SERUM VITAMIN B12: ICD-10-CM

## 2023-10-17 DIAGNOSIS — J01.00 ACUTE NON-RECURRENT MAXILLARY SINUSITIS: Primary | ICD-10-CM

## 2023-10-17 DIAGNOSIS — Z80.3 FAMILY HISTORY OF BREAST CANCER: ICD-10-CM

## 2023-10-17 DIAGNOSIS — Q45.3 PANCREAS DIVISUM, NATIVE: ICD-10-CM

## 2023-10-17 DIAGNOSIS — Z51.81 MEDICATION MONITORING ENCOUNTER: ICD-10-CM

## 2023-10-17 DIAGNOSIS — Z12.31 BREAST CANCER SCREENING BY MAMMOGRAM: ICD-10-CM

## 2023-10-17 DIAGNOSIS — E03.9 ACQUIRED HYPOTHYROIDISM: ICD-10-CM

## 2023-10-17 LAB
AMPHETAMINE, URINE, POC: NEGATIVE
BARBITURATES, URINE, POC: NEGATIVE
BENZODIAZEPINES, URINE, POC: NEGATIVE
COCAINE, URINE, POC: NEGATIVE
LOT EXP DATE, POC: NORMAL
Lab: NORMAL
MARIJUANA (THC), URINE, POC: NEGATIVE
MDMA/ECSTASY, URINE, POC: NEGATIVE
METHADONE, URINE, POC: NEGATIVE
METHAMPHETAMINE, URINE, POC: NEGATIVE
NTI OTHER MICRO TRANSPORT: 0
OPIATES, URINE, POC: NEGATIVE
OXYCODONE, URINE, POC: NEGATIVE
VALID INTERNAL CONTROL, POC: YES

## 2023-10-17 PROCEDURE — 1123F ACP DISCUSS/DSCN MKR DOCD: CPT | Performed by: INTERNAL MEDICINE

## 2023-10-17 PROCEDURE — 3017F COLORECTAL CA SCREEN DOC REV: CPT | Performed by: INTERNAL MEDICINE

## 2023-10-17 PROCEDURE — G8427 DOCREV CUR MEDS BY ELIG CLIN: HCPCS | Performed by: INTERNAL MEDICINE

## 2023-10-17 PROCEDURE — G8420 CALC BMI NORM PARAMETERS: HCPCS | Performed by: INTERNAL MEDICINE

## 2023-10-17 PROCEDURE — G8484 FLU IMMUNIZE NO ADMIN: HCPCS | Performed by: INTERNAL MEDICINE

## 2023-10-17 PROCEDURE — 80305 DRUG TEST PRSMV DIR OPT OBS: CPT | Performed by: INTERNAL MEDICINE

## 2023-10-17 PROCEDURE — 1036F TOBACCO NON-USER: CPT | Performed by: INTERNAL MEDICINE

## 2023-10-17 PROCEDURE — 1090F PRES/ABSN URINE INCON ASSESS: CPT | Performed by: INTERNAL MEDICINE

## 2023-10-17 PROCEDURE — G8399 PT W/DXA RESULTS DOCUMENT: HCPCS | Performed by: INTERNAL MEDICINE

## 2023-10-17 PROCEDURE — 99214 OFFICE O/P EST MOD 30 MIN: CPT | Performed by: INTERNAL MEDICINE

## 2023-10-17 PROCEDURE — PBSHW AMB POC ALERE ICUP DX DRUG SCREEN 10: Performed by: INTERNAL MEDICINE

## 2023-10-17 RX ORDER — CEFDINIR 300 MG/1
300 CAPSULE ORAL 2 TIMES DAILY
Qty: 14 CAPSULE | Refills: 0 | Status: SHIPPED | OUTPATIENT
Start: 2023-10-17 | End: 2023-10-24

## 2023-10-17 NOTE — PROGRESS NOTES
Norma Acuna (:  1955) is a 80 y.o. female,Established patient, here for evaluation of the following chief complaint(s):  6 Month Follow-Up, Anxiety, and Dizziness (Patient recently got back from [facility], states she tested negative for Covid 2 times. Patient states she's having some vertigo, head congestion and tightness in her chest. Would like evaluated. )         ASSESSMENT/PLAN:  1. Acute non-recurrent maxillary sinusitis-pcn allergy was hives so treat with cephalosporin and discussed rare cross allergy  Flonase qd  -     cefdinir (OMNICEF) 300 MG capsule; Take 1 capsule by mouth 2 times daily for 7 days, Disp-14 capsule, R-0Normal  2. Medication monitoring encounter  -     [facility]  3. Pancreas divisum, native-no recent episodes of pancreatitis  -     Comprehensive Metabolic Panel; Future  -     Comprehensive Metabolic Panel; Future  4. Acquired hypothyroidism  -     TSH; Future  -     T4, Free; Future  -     TSH; Future  -     T4, Free; Future  -     Lipid Panel; Future  5. Low serum vitamin B12  -     Vitamin B12; Future  -     Vitamin B12; Future  6. Family history of breast cancer-refer for genetic counseling  mammo      No follow-ups on file. Subjective   SUBJECTIVE/OBJECTIVE:  Anxiety  Symptoms include dizziness. Dizziness      Roughly 2 weeks ago she flew back from [facility]. Had upper respiratory symptoms including congestion sinus pressure some mostly nonproductive cough no fevers or chills. Tested negative for COVID twice. 2 weeks later still feels a lot of head pressure and as though it is stuck with some vertigo no shortness of breath no chest pain using over-the-counter meds. Thyroid on 88 mcg dose really no symptoms of hypo or hyperthyroidism. Mild neuropathy B12 levels were low she is taking over-the-counter 1000 mcg daily. History of pancreatitis has really changed her diet and is avoiding alcohol and has not had any recent symptoms.

## 2023-11-01 ENCOUNTER — HOSPITAL ENCOUNTER (OUTPATIENT)
Age: 68
Discharge: HOME | End: 2023-11-04
Payer: MEDICARE

## 2023-11-01 VITALS — HEIGHT: 68 IN | WEIGHT: 140 LBS | BODY MASS INDEX: 21.22 KG/M2

## 2023-11-01 DIAGNOSIS — Q45.3 PANCREAS DIVISUM, NATIVE: ICD-10-CM

## 2023-11-01 DIAGNOSIS — Z12.31 BREAST CANCER SCREENING BY MAMMOGRAM: ICD-10-CM

## 2023-11-01 DIAGNOSIS — E03.9 ACQUIRED HYPOTHYROIDISM: ICD-10-CM

## 2023-11-01 DIAGNOSIS — E53.8 LOW SERUM VITAMIN B12: ICD-10-CM

## 2023-11-01 DIAGNOSIS — Z80.3 FAMILY HISTORY OF BREAST CANCER: ICD-10-CM

## 2023-11-01 PROCEDURE — 77063 BREAST TOMOSYNTHESIS BI: CPT

## 2023-11-02 LAB
ALBUMIN SERPL-MCNC: 3.6 G/DL (ref 3.5–5)
ALBUMIN/GLOB SERPL: 1.2 (ref 1.1–2.2)
ALP SERPL-CCNC: 115 U/L (ref 45–117)
ALT SERPL-CCNC: 26 U/L (ref 12–78)
ANION GAP SERPL CALC-SCNC: 5 MMOL/L (ref 5–15)
AST SERPL-CCNC: 25 U/L (ref 15–37)
BILIRUB SERPL-MCNC: 0.9 MG/DL (ref 0.2–1)
BUN SERPL-MCNC: 13 MG/DL (ref 6–20)
BUN/CREAT SERPL: 18 (ref 12–20)
CALCIUM SERPL-MCNC: 9.4 MG/DL (ref 8.5–10.1)
CHLORIDE SERPL-SCNC: 108 MMOL/L (ref 97–108)
CO2 SERPL-SCNC: 27 MMOL/L (ref 21–32)
CREAT SERPL-MCNC: 0.74 MG/DL (ref 0.55–1.02)
GLOBULIN SER CALC-MCNC: 3 G/DL (ref 2–4)
GLUCOSE SERPL-MCNC: 95 MG/DL (ref 65–100)
POTASSIUM SERPL-SCNC: 3.9 MMOL/L (ref 3.5–5.1)
PROT SERPL-MCNC: 6.6 G/DL (ref 6.4–8.2)
SODIUM SERPL-SCNC: 140 MMOL/L (ref 136–145)
T4 FREE SERPL-MCNC: 1.2 NG/DL (ref 0.8–1.5)
TSH SERPL DL<=0.05 MIU/L-ACNC: 1.78 UIU/ML (ref 0.36–3.74)
VIT B12 SERPL-MCNC: 1110 PG/ML (ref 193–986)

## 2023-11-02 RX ORDER — LEVOTHYROXINE SODIUM 88 MCG
88 TABLET ORAL EVERY MORNING
Qty: 90 TABLET | Refills: 2 | Status: SHIPPED | OUTPATIENT
Start: 2023-11-02

## 2024-01-11 ENCOUNTER — HOSPITAL ENCOUNTER (OUTPATIENT)
Facility: HOSPITAL | Age: 69
Discharge: HOME | End: 2024-01-11
Payer: MEDICARE

## 2024-01-11 VITALS
HEIGHT: 68 IN | OXYGEN SATURATION: 97 % | WEIGHT: 145.6 LBS | SYSTOLIC BLOOD PRESSURE: 112 MMHG | HEART RATE: 75 BPM | TEMPERATURE: 97.7 F | DIASTOLIC BLOOD PRESSURE: 72 MMHG | BODY MASS INDEX: 22.07 KG/M2

## 2024-01-11 PROCEDURE — 93005 ELECTROCARDIOGRAM TRACING: CPT | Performed by: ORTHOPAEDIC SURGERY

## 2024-01-11 ASSESSMENT — PAIN DESCRIPTION - ORIENTATION: ORIENTATION: RIGHT

## 2024-01-11 ASSESSMENT — PAIN DESCRIPTION - DESCRIPTORS: DESCRIPTORS: DULL

## 2024-01-11 ASSESSMENT — PAIN SCALES - GENERAL: PAINLEVEL_OUTOF10: 3

## 2024-01-11 ASSESSMENT — PAIN DESCRIPTION - LOCATION: LOCATION: KNEE

## 2024-01-11 NOTE — PERIOP NOTE
[facility]  PREOPERATIVE INSTRUCTIONS    Surgery Date:   1/18/24    Your surgeon's office or [facility] staff will call you between 4 PM- 8 PM the day before surgery with your arrival time. If your surgery is on a Monday, you will receive a call the preceding Friday. If your surgeon's office has given you, your arrival time then go by that time.    Please report to [facility] Patient Access/Admitting on the 1st floor.  Bring your insurance card, photo identification, and any copayment ( if applicable).   If you are going home the same day of your surgery, you must have a responsible adult to drive you home. You need to have a responsible adult to stay with you the first 24 hours after surgery and you should not drive a car for 24 hours following your surgery.  If you are being admitted to the hospital, please leave personal belongings/luggage in your car until you have an assigned hospital room number.  Do NOT drink alcohol or smoke 24 hours before surgery. STOP smoking for 14 days prior as it helps with breathing and healing after surgery.  Please wear comfortable clothes. Wear your glasses instead of contacts. We ask that all money, jewelry and valuables be left at home. Wear no make up, particularly mascara, the day of surgery.    All body piercings, rings, and jewelry need to be removed and left at home. Remove all nail polish except for clear. Please wear your hair loose or down, no pony-tails, buns, or any metal hair accessories. You may wear deodorant, unless having breast surgery.  Do not shave any body area within 24 hours of your surgery.  Please follow all instructions to avoid any potential surgical cancellation.  Should your physical condition change, (i.e. fever, cold, flu, etc.) please notify your surgeon as soon as possible.  It is important to be on time. If a situation occurs where you may be delayed, please call:  [phone] / [phone] on the day of surgery.  The Preadmission  prevent infections.  Keep your bandage clean and dry!  Do not touch your surgical wound.  Use clean, freshly washed towels and washcloths every time you shower; do not share bath linens with others.  Until your surgical wound is healed, wear clothing and sleep on bed linens each day that are clean and freshly washed.  Do not allow pets to sleep in your bed with you or touch your surgical wound.  Do not smoke - smoking delays wound healing. This may be a good time to stop smoking.  If you have diabetes, it is important for you to manage your blood sugar levels properly before your surgery as well as after your surgery. Poorly managed blood sugar levels slow down wound healing and prevent you from healing completely.        Day of Procedure    Please park in the parking deck or any designated visitor parking lot.  Enter the facility through the Main Entrance of the hospital.  On the day of surgery, please provide the cell phone number of the person who will be waiting for you to the Patient Access representative at the time of registration.  Masks are highly recommended in the hospital, but not required.  Once your procedure and the immediate recovery period is completed, a nurse in the recovery area will contact your designated visitor to inform them of your room number or to otherwise review other pertinent information regarding your care.    Social distancing practices are strongly encouraged in waiting areas and the cafeteria.       The patient was contacted in person.   She verbalized understanding of all instructions and does not need reinforcement.

## 2024-01-12 LAB
EKG ATRIAL RATE: 68 BPM
EKG DIAGNOSIS: NORMAL
EKG P AXIS: 26 DEGREES
EKG P-R INTERVAL: 212 MS
EKG Q-T INTERVAL: 384 MS
EKG QRS DURATION: 78 MS
EKG QTC CALCULATION (BAZETT): 408 MS
EKG R AXIS: 34 DEGREES
EKG T AXIS: 61 DEGREES
EKG VENTRICULAR RATE: 68 BPM

## 2024-01-12 PROCEDURE — 93010 ELECTROCARDIOGRAM REPORT: CPT | Performed by: SPECIALIST

## 2024-01-18 ENCOUNTER — ANESTHESIA (OUTPATIENT)
Facility: HOSPITAL | Age: 69
End: 2024-01-18
Payer: MEDICARE

## 2024-01-18 ENCOUNTER — HOSPITAL ENCOUNTER (OUTPATIENT)
Facility: HOSPITAL | Age: 69
Setting detail: OUTPATIENT SURGERY
Discharge: HOME | End: 2024-01-18
Attending: ORTHOPAEDIC SURGERY | Admitting: ORTHOPAEDIC SURGERY
Payer: MEDICARE

## 2024-01-18 ENCOUNTER — ANESTHESIA EVENT (OUTPATIENT)
Facility: HOSPITAL | Age: 69
End: 2024-01-18
Payer: MEDICARE

## 2024-01-18 VITALS
OXYGEN SATURATION: 96 % | RESPIRATION RATE: 17 BRPM | DIASTOLIC BLOOD PRESSURE: 78 MMHG | SYSTOLIC BLOOD PRESSURE: 123 MMHG | HEART RATE: 71 BPM | TEMPERATURE: 97.5 F

## 2024-01-18 DIAGNOSIS — Z48.89 ENCOUNTER FOR POSTOPERATIVE CARE: Primary | ICD-10-CM

## 2024-01-18 PROCEDURE — 2500000003 HC RX 250 WO HCPCS

## 2024-01-18 PROCEDURE — 6360000002 HC RX W HCPCS: Performed by: ANESTHESIOLOGY

## 2024-01-18 PROCEDURE — 6360000002 HC RX W HCPCS: Performed by: PHYSICIAN ASSISTANT

## 2024-01-18 PROCEDURE — 2500000003 HC RX 250 WO HCPCS: Performed by: ORTHOPAEDIC SURGERY

## 2024-01-18 PROCEDURE — 6360000002 HC RX W HCPCS: Performed by: ORTHOPAEDIC SURGERY

## 2024-01-18 PROCEDURE — 3600000004 HC SURGERY LEVEL 4 BASE: Performed by: ORTHOPAEDIC SURGERY

## 2024-01-18 PROCEDURE — 3600000014 HC SURGERY LEVEL 4 ADDTL 15MIN: Performed by: ORTHOPAEDIC SURGERY

## 2024-01-18 PROCEDURE — 2709999900 HC NON-CHARGEABLE SUPPLY: Performed by: ORTHOPAEDIC SURGERY

## 2024-01-18 PROCEDURE — 3700000000 HC ANESTHESIA ATTENDED CARE: Performed by: ORTHOPAEDIC SURGERY

## 2024-01-18 PROCEDURE — 6360000002 HC RX W HCPCS

## 2024-01-18 PROCEDURE — 7100000000 HC PACU RECOVERY - FIRST 15 MIN: Performed by: ORTHOPAEDIC SURGERY

## 2024-01-18 PROCEDURE — 2580000003 HC RX 258: Performed by: PHYSICIAN ASSISTANT

## 2024-01-18 PROCEDURE — 7100000001 HC PACU RECOVERY - ADDTL 15 MIN: Performed by: ORTHOPAEDIC SURGERY

## 2024-01-18 PROCEDURE — 2580000003 HC RX 258

## 2024-01-18 PROCEDURE — 3700000001 HC ADD 15 MINUTES (ANESTHESIA): Performed by: ORTHOPAEDIC SURGERY

## 2024-01-18 RX ORDER — SODIUM CHLORIDE 0.9 % (FLUSH) 0.9 %
5-40 SYRINGE (ML) INJECTION PRN
Status: DISCONTINUED | OUTPATIENT
Start: 2024-01-18 | End: 2024-01-18 | Stop reason: HOSPADM

## 2024-01-18 RX ORDER — HYDRALAZINE HYDROCHLORIDE 20 MG/ML
10 INJECTION INTRAMUSCULAR; INTRAVENOUS
Status: DISCONTINUED | OUTPATIENT
Start: 2024-01-18 | End: 2024-01-18 | Stop reason: HOSPADM

## 2024-01-18 RX ORDER — ROPIVACAINE HYDROCHLORIDE 5 MG/ML
INJECTION, SOLUTION EPIDURAL; INFILTRATION; PERINEURAL PRN
Status: DISCONTINUED | OUTPATIENT
Start: 2024-01-18 | End: 2024-01-18 | Stop reason: HOSPADM

## 2024-01-18 RX ORDER — SODIUM CHLORIDE 9 MG/ML
INJECTION, SOLUTION INTRAVENOUS PRN
Status: DISCONTINUED | OUTPATIENT
Start: 2024-01-18 | End: 2024-01-18 | Stop reason: HOSPADM

## 2024-01-18 RX ORDER — MIDAZOLAM HYDROCHLORIDE 1 MG/ML
INJECTION INTRAMUSCULAR; INTRAVENOUS PRN
Status: DISCONTINUED | OUTPATIENT
Start: 2024-01-18 | End: 2024-01-18 | Stop reason: SDUPTHER

## 2024-01-18 RX ORDER — EPHEDRINE SULFATE 50 MG/ML
INJECTION INTRAVENOUS PRN
Status: DISCONTINUED | OUTPATIENT
Start: 2024-01-18 | End: 2024-01-18 | Stop reason: SDUPTHER

## 2024-01-18 RX ORDER — LIDOCAINE HYDROCHLORIDE 20 MG/ML
INJECTION, SOLUTION EPIDURAL; INFILTRATION; INTRACAUDAL; PERINEURAL PRN
Status: DISCONTINUED | OUTPATIENT
Start: 2024-01-18 | End: 2024-01-18 | Stop reason: SDUPTHER

## 2024-01-18 RX ORDER — LIDOCAINE HYDROCHLORIDE 10 MG/ML
1 INJECTION, SOLUTION INFILTRATION; PERINEURAL
Status: DISCONTINUED | OUTPATIENT
Start: 2024-01-18 | End: 2024-01-18 | Stop reason: HOSPADM

## 2024-01-18 RX ORDER — MIDAZOLAM HYDROCHLORIDE 2 MG/2ML
2 INJECTION, SOLUTION INTRAMUSCULAR; INTRAVENOUS
Status: DISCONTINUED | OUTPATIENT
Start: 2024-01-18 | End: 2024-01-18 | Stop reason: HOSPADM

## 2024-01-18 RX ORDER — ACETAMINOPHEN 500 MG
1000 TABLET ORAL ONCE
Status: DISCONTINUED | OUTPATIENT
Start: 2024-01-18 | End: 2024-01-18 | Stop reason: HOSPADM

## 2024-01-18 RX ORDER — DIPHENHYDRAMINE HYDROCHLORIDE 50 MG/ML
12.5 INJECTION INTRAMUSCULAR; INTRAVENOUS
Status: DISCONTINUED | OUTPATIENT
Start: 2024-01-18 | End: 2024-01-18 | Stop reason: HOSPADM

## 2024-01-18 RX ORDER — SODIUM CHLORIDE, SODIUM LACTATE, POTASSIUM CHLORIDE, CALCIUM CHLORIDE 600; 310; 30; 20 MG/100ML; MG/100ML; MG/100ML; MG/100ML
INJECTION, SOLUTION INTRAVENOUS CONTINUOUS
Status: DISCONTINUED | OUTPATIENT
Start: 2024-01-18 | End: 2024-01-18 | Stop reason: HOSPADM

## 2024-01-18 RX ORDER — ONDANSETRON 2 MG/ML
INJECTION INTRAMUSCULAR; INTRAVENOUS PRN
Status: DISCONTINUED | OUTPATIENT
Start: 2024-01-18 | End: 2024-01-18 | Stop reason: SDUPTHER

## 2024-01-18 RX ORDER — ONDANSETRON 2 MG/ML
4 INJECTION INTRAMUSCULAR; INTRAVENOUS
Status: DISCONTINUED | OUTPATIENT
Start: 2024-01-18 | End: 2024-01-18 | Stop reason: HOSPADM

## 2024-01-18 RX ORDER — OXYCODONE HYDROCHLORIDE 5 MG/1
5 TABLET ORAL
Status: DISCONTINUED | OUTPATIENT
Start: 2024-01-18 | End: 2024-01-18 | Stop reason: HOSPADM

## 2024-01-18 RX ORDER — LORAZEPAM 2 MG/ML
0.5 INJECTION INTRAMUSCULAR
Status: DISCONTINUED | OUTPATIENT
Start: 2024-01-18 | End: 2024-01-18 | Stop reason: RX

## 2024-01-18 RX ORDER — SODIUM CHLORIDE 0.9 % (FLUSH) 0.9 %
5-40 SYRINGE (ML) INJECTION EVERY 12 HOURS SCHEDULED
Status: DISCONTINUED | OUTPATIENT
Start: 2024-01-18 | End: 2024-01-18 | Stop reason: HOSPADM

## 2024-01-18 RX ORDER — FENTANYL CITRATE 50 UG/ML
INJECTION, SOLUTION INTRAMUSCULAR; INTRAVENOUS PRN
Status: DISCONTINUED | OUTPATIENT
Start: 2024-01-18 | End: 2024-01-18 | Stop reason: SDUPTHER

## 2024-01-18 RX ORDER — PROCHLORPERAZINE EDISYLATE 5 MG/ML
5 INJECTION INTRAMUSCULAR; INTRAVENOUS
Status: DISCONTINUED | OUTPATIENT
Start: 2024-01-18 | End: 2024-01-18 | Stop reason: HOSPADM

## 2024-01-18 RX ORDER — HYDROMORPHONE HYDROCHLORIDE 1 MG/ML
0.5 INJECTION, SOLUTION INTRAMUSCULAR; INTRAVENOUS; SUBCUTANEOUS EVERY 5 MIN PRN
Status: DISCONTINUED | OUTPATIENT
Start: 2024-01-18 | End: 2024-01-18 | Stop reason: HOSPADM

## 2024-01-18 RX ORDER — FENTANYL CITRATE 50 UG/ML
25 INJECTION, SOLUTION INTRAMUSCULAR; INTRAVENOUS EVERY 5 MIN PRN
Status: DISCONTINUED | OUTPATIENT
Start: 2024-01-18 | End: 2024-01-18 | Stop reason: HOSPADM

## 2024-01-18 RX ORDER — FENTANYL CITRATE 50 UG/ML
100 INJECTION, SOLUTION INTRAMUSCULAR; INTRAVENOUS
Status: DISCONTINUED | OUTPATIENT
Start: 2024-01-18 | End: 2024-01-18 | Stop reason: HOSPADM

## 2024-01-18 RX ORDER — LIDOCAINE HYDROCHLORIDE AND EPINEPHRINE 10; 10 MG/ML; UG/ML
INJECTION, SOLUTION INFILTRATION; PERINEURAL PRN
Status: DISCONTINUED | OUTPATIENT
Start: 2024-01-18 | End: 2024-01-18 | Stop reason: HOSPADM

## 2024-01-18 RX ORDER — IPRATROPIUM BROMIDE AND ALBUTEROL SULFATE 2.5; .5 MG/3ML; MG/3ML
1 SOLUTION RESPIRATORY (INHALATION)
Status: DISCONTINUED | OUTPATIENT
Start: 2024-01-18 | End: 2024-01-18 | Stop reason: HOSPADM

## 2024-01-18 RX ORDER — DEXAMETHASONE SODIUM PHOSPHATE 4 MG/ML
INJECTION, SOLUTION INTRA-ARTICULAR; INTRALESIONAL; INTRAMUSCULAR; INTRAVENOUS; SOFT TISSUE PRN
Status: DISCONTINUED | OUTPATIENT
Start: 2024-01-18 | End: 2024-01-18 | Stop reason: SDUPTHER

## 2024-01-18 RX ORDER — SODIUM CHLORIDE, SODIUM LACTATE, POTASSIUM CHLORIDE, CALCIUM CHLORIDE 600; 310; 30; 20 MG/100ML; MG/100ML; MG/100ML; MG/100ML
INJECTION, SOLUTION INTRAVENOUS CONTINUOUS PRN
Status: DISCONTINUED | OUTPATIENT
Start: 2024-01-18 | End: 2024-01-18 | Stop reason: SDUPTHER

## 2024-01-18 RX ORDER — OXYCODONE HYDROCHLORIDE 5 MG/1
5 TABLET ORAL EVERY 4 HOURS PRN
Qty: 28 TABLET | Refills: 0 | Status: SHIPPED | OUTPATIENT
Start: 2024-01-18 | End: 2024-01-25

## 2024-01-18 RX ADMIN — FENTANYL CITRATE 25 MCG: 50 INJECTION INTRAMUSCULAR; INTRAVENOUS at 12:33

## 2024-01-18 RX ADMIN — EPHEDRINE SULFATE 5 MG: 50 INJECTION INTRAVENOUS at 11:55

## 2024-01-18 RX ADMIN — MIDAZOLAM 2 MG: 1 INJECTION INTRAMUSCULAR; INTRAVENOUS at 11:18

## 2024-01-18 RX ADMIN — EPHEDRINE SULFATE 5 MG: 50 INJECTION INTRAVENOUS at 11:43

## 2024-01-18 RX ADMIN — PROPOFOL 30 MG: 10 INJECTION, EMULSION INTRAVENOUS at 11:34

## 2024-01-18 RX ADMIN — PROPOFOL 150 MG: 10 INJECTION, EMULSION INTRAVENOUS at 11:27

## 2024-01-18 RX ADMIN — SODIUM CHLORIDE, POTASSIUM CHLORIDE, SODIUM LACTATE AND CALCIUM CHLORIDE: 600; 310; 30; 20 INJECTION, SOLUTION INTRAVENOUS at 11:03

## 2024-01-18 RX ADMIN — LIDOCAINE HYDROCHLORIDE 60 MG: 20 INJECTION, SOLUTION EPIDURAL; INFILTRATION; INTRACAUDAL; PERINEURAL at 11:27

## 2024-01-18 RX ADMIN — ONDANSETRON HYDROCHLORIDE 4 MG: 2 INJECTION, SOLUTION INTRAMUSCULAR; INTRAVENOUS at 11:35

## 2024-01-18 RX ADMIN — DEXAMETHASONE SODIUM PHOSPHATE 4 MG: 4 INJECTION, SOLUTION INTRAMUSCULAR; INTRAVENOUS at 11:35

## 2024-01-18 RX ADMIN — SODIUM CHLORIDE 1000 MG: 9 INJECTION, SOLUTION INTRAVENOUS at 10:45

## 2024-01-18 RX ADMIN — FENTANYL CITRATE 25 MCG: 50 INJECTION INTRAMUSCULAR; INTRAVENOUS at 11:35

## 2024-01-18 ASSESSMENT — PAIN DESCRIPTION - ORIENTATION
ORIENTATION: RIGHT
ORIENTATION: RIGHT

## 2024-01-18 ASSESSMENT — PAIN DESCRIPTION - LOCATION
LOCATION: KNEE
LOCATION: KNEE

## 2024-01-18 ASSESSMENT — PAIN DESCRIPTION - DESCRIPTORS
DESCRIPTORS: DULL
DESCRIPTORS: SHARP

## 2024-01-18 ASSESSMENT — PAIN SCALES - GENERAL
PAINLEVEL_OUTOF10: 5
PAINLEVEL_OUTOF10: 0
PAINLEVEL_OUTOF10: 2

## 2024-01-18 ASSESSMENT — PAIN - FUNCTIONAL ASSESSMENT: PAIN_FUNCTIONAL_ASSESSMENT: 0-10

## 2024-01-18 NOTE — ANESTHESIA PRE PROCEDURE
Department of Anesthesiology  Preprocedure Note       Name:  Cynthia Benites   Age:  68 y.o.  :  1955                                          MRN:  [id number]         Date:  2024      Surgeon: Surgeon(s):  Chanda Sanchez MD (Jody)    Procedure: Procedure(s):  RIGHT KNEE ARTHROSCOPIC PARTIAL LATERAL MENISCECTOMY, LATERAL FEMORAL CONDYLE CONDROPLASTY    Medications prior to admission:   Prior to Admission medications    Medication Sig Start Date End Date Taking? Authorizing Provider   oxyCODONE (ROXICODONE) 5 MG immediate release tablet Take 1 tablet by mouth every 4 hours as needed for Pain for up to 7 days. DO NOT TAKE WITH XANAX. Max Daily Amount: 30 mg 24 Yes Mary Gallego PA-C   SYNTHROID 88 MCG tablet TAKE 1 TABLET BY MOUTH EVERY MORNING 23   Cristina Patel MD   Cholecalciferol 50 MCG ( UT) CAPS Take by mouth daily  Patient not taking: Reported on 2024    Automatic Reconciliation, Ar   estrogens conjugated (PREMARIN) 0.625 MG/GM CREA vaginal cream INSERT 1 GRAM VAGINALLY 2 TIMES A WEEK AS DIRECTED  Patient not taking: Reported on 2024   Automatic Reconciliation, Ar       Current medications:    Current Facility-Administered Medications   Medication Dose Route Frequency Provider Last Rate Last Admin    sodium chloride flush 0.9 % injection 5-40 mL  5-40 mL IntraVENous 2 times per day Naveen Keene MD        sodium chloride flush 0.9 % injection 5-40 mL  5-40 mL IntraVENous PRN Naveen Keene MD        0.9 % sodium chloride infusion   IntraVENous PRN Naveen Keene MD        fentaNYL (SUBLIMAZE) injection 25 mcg  25 mcg IntraVENous Q5 Min PRN Naveen Keene MD   25 mcg at 24 1233    HYDROmorphone HCl PF (DILAUDID) injection 0.5 mg  0.5 mg IntraVENous Q5 Min PRN Naveen Keene MD        oxyCODONE (ROXICODONE) immediate release tablet 5 mg  5 mg Oral Once PRN Naveen Keene MD        ondansetron (ZOFRAN) injection 4 mg  4 mg

## 2024-01-18 NOTE — H&P
Patient ID: Cynthia Benites is a 68 y.o. female.  Pain Score: 0-No pain  Chief Complaint: Pain of the Right Knee    HISTORY OF PRESENT ILLNESS   Cynthia Benites is a 68 y.o. female who presents today for pain of the right knee. The Pt has a history of neuromas and plantar fasciitis which causes difficulty with picking up her feet. She was playing Pickle ball mid October where she had trouble picking up her feet causing her knee to hyperextend. She had sudden onset pain at the time of the event where she had to ice for relief. The Pt c/o episodic pain at night without a brace and pain/buckling with ambulation.     Past Medical History:   Diagnosis Date   Autoimmune disease   Deep vein thrombosis   Fibromyalgia, primary     Past Surgical History:   Procedure Laterality Date   HAND SURGERY   KNEE SURGERY Left   NO RELEVANT SURGERIES     Family History   Problem Relation Age of Onset   Diabetes Sister   Diabetes Brother   Coronary artery disease Father   No Known Problems Mother   No Known Problems Son   No Known Problems Daughter     Social History     Tobacco Use   Smoking status: Never   Smokeless tobacco: Never   Substance Use Topics   Alcohol use: Yes   Comment: twice a week   Drug use: Never     Review of Systems   12/12/2023    Constitutional: Unexplained: Negative  Genitourinary: Frequent Urination: Negative  HEENT: Vision Loss: Negative  Neurological: Memory Loss: Negative  Integumentary: Rash: Negative  Cardiovascular: Palpatations: Negative  Hematologic: Bruises/Bleeds Easily: Negative  Gastrointestinal: Constipation: Negative  Immunological: Seasonal Allergies: Negative  Musculoskeletal: Joint Pain: Positive  Objective:     Vitals:   12/12/23 0913   Weight: 140 lb   Height: 5' 8\"     Constitutional: No acute distress. Well nourished. Well developed.  Psychiatric: Alert and oriented x3.  Eyes: Sclera are nonicteric.  Respiratory: No labored breathing.  Cardiovascular: No marked edema.  Skin: No marked skin

## 2024-01-18 NOTE — ANESTHESIA POSTPROCEDURE EVALUATION
Post-Anesthesia Evaluation and Assessment    Patient: Cynthia Benites MRN: [id number]  SSN: [id number]    YOB: 1955  Age: 68 y.o.  Sex: female      I have evaluated the patient and they are stable and ready for discharge from the PACU.     Cardiovascular Function/Vital Signs  Visit Vitals  /78   Pulse 71   Temp 97.5 °F (36.4 °C) (Oral)   Resp 17   SpO2 96%       Patient is status post General anesthesia for Procedure(s):  RIGHT KNEE ARTHROSCOPIC PARTIAL LATERAL MENISCECTOMY, LATERAL FEMORAL CONDYLE CONDROPLASTY.    Nausea/Vomiting: None    Postoperative hydration reviewed and adequate.    Pain:      Managed    Neurological Status:       At baseline    Mental Status, Level of Consciousness: Alert and  oriented to person, place, and time    Pulmonary Status:       Adequate oxygenation and airway patent    Complications related to anesthesia: None    Post-anesthesia assessment completed. No concerns    Signed By: Naveen Keene MD     January 18, 2024            Department of Anesthesiology  Postprocedure Note    Patient: Cynthia Benites  MRN: [id number]  YOB: 1955  Date of evaluation: 1/18/2024    Procedure Summary       Date: 01/18/24 Room / Location: [facility] ASU A4 / [facility] AMBULATORY OR    Anesthesia Start: 1118 Anesthesia Stop: 1213    Procedure: RIGHT KNEE ARTHROSCOPIC PARTIAL LATERAL MENISCECTOMY, LATERAL FEMORAL CONDYLE CONDROPLASTY (Right: Knee) Diagnosis:       Acute lateral meniscus tear of right knee, initial encounter      (Acute lateral meniscus tear of right knee, initial encounter [S83.281A])    Surgeons: Chanda Sanchez MD (Jody) Responsible Provider: Naveen Keene MD    Anesthesia Type: General ASA Status: 2            Anesthesia Type: General    Brooklynn Phase I: Brooklynn Score: 10    Brooklynn Phase II:      Anesthesia Post Evaluation    No notable events documented.

## 2024-01-18 NOTE — OP NOTE
Operative Note    Patient: Cynthia Benites MRN: [id number]  SSN: [id number]    YOB: 1955  Age: 68 y.o.  Sex: female      DATE OF SURGERY:  1/18/2024    Date of Surgery: [unfilled]     Preoperative Diagnosis:    Acute lateral meniscus tear of right knee    Postoperative Diagnosis: 1. Acute lateral meniscus tear of right knee      2. DJD Right Knee    Procedures: 1. RIGHT KNEE ARTHROSCOPIC PARTIAL LATERAL MENISCECTOMY  2. LATERAL FEMORAL CONDYLE CHONDROPLASTY      Surgeon(s) and Role:     * Chanda Sanchez MD (Jody) - Primary     Anesthesia: General    Pathology:DJD and Lateral Meniscus Tear    Estimated Blood Loss:  <20 ml      Specimens: * No specimens in log *     Complications: None         Indications: The patient is a 68 y.o. female with a known history of medial meniscus tear in the right knee.  Preoperative physical examination, radiographs, and magnetic resonance imaging demonstrated Medial Meniscus Tear in Her right knee. She is now electively admitted for knee arthroscopy.    Procedure in Detail: After the procedure was explained to the patient, including the risks, benefits and possible complications, the patient signed the informed consent. Following identification, the patient was taken to the operating suite. Following induction of general anesthesia and administration of antibiotic, the patient was positioned on the operating table in the supine fashion. The right knee was then examined under anesthesia. The patient was noted to have a negative Lachman and pivot shift. At this point, the right leg was then prepped and draped in sterile fashion. The right leg was then elevated and exsanguinated with an Esmarch bandage. The tourniquet was elevated to 300 mmHg. At this point, a lateral portal was created and the joint was distended and fully inspected.  The scope was introduced into the knee. Diagnostic arthroscopy commenced. Suprapatellar pouch and medial and lateral gutters were visualized.

## 2024-01-18 NOTE — DISCHARGE INSTRUCTIONS
POST OPERATIVE INSTRUCTIONS  Knee Arthroscopy   Chanda Sanchez MD III  Mg Gallego PA-C          First meal at home should be clear liquids. Progress to regular diet as tolerated.        Apply an ice bag or use cold therapy device continuously for the first 2-3 days and then apply several times a day for the next 10 days to reduce pain and swelling               After that you can ice as desired.         You may remove the bulky dressing  48  hours after surgery and at that time it is ok to shower. You can get the incisions a little wet, but please do not submerge in a tub or pool.  Apply band aids over the small incisions and change daily.         Elevate your surgical leg when sitting or sleeping to reduce swelling.  Do not place a pillow directly under the knee, place it under your ankle.  It is important to work on getting your knee out all the way straight.         Exercises- (see Below) Please practice quadricep tightening, straight leg raising exercises and ankle pumps several times a day.  Gradually work on bending your knee as tolerated.                      See below       Crutches will be provided if you do not already have them.  Please use crutches to help you weight bear for the first few days.   After than you can gradually wean off the crutches as you tolerate.      If you are limping a lot, we recommend that you stay with at least one crutch until your gait is improved.          Medication Instructions : You have been given oxycodone 5mg tablets. You may take 1 tablet every 4 hours as needed for pain.          It is ok to supplement with Tylenol and Ibuprofen or Aleve                PLEASE TAKE 1 ASPIRIN (81mg ) TWICE A DAY FOR 10 DAYS            ALL NARCOTICS CAN CAUSE CONSTIPATION, SO WE RECOMMEND THAT YOU DRINK PLENTY OF WATER AND TAKE AN OTC STOOL SOFTENER              SUCH AS MIRALAX '          8.  A post- op appointment has already been made for you on 1/30/2024 @ 8:30am with mg gallego

## 2024-02-15 ENCOUNTER — TELEPHONE (OUTPATIENT)
Age: 69
End: 2024-02-15

## 2024-02-15 NOTE — TELEPHONE ENCOUNTER
----- Message from Hanh Welch sent at 2/15/2024  9:00 AM EST -----  Subject: Message to Provider    QUESTIONS  Information for Provider? Patient would like a status update on the   appointment for a pre-op. She asked that if Dr. Patel could not see her if   someone else could. Please call to advise. If no answer then leave a   message and phone number to call.   ---------------------------------------------------------------------------  --------------  CALL BACK INFO  [phone]; OK to leave message on voicemail  ---------------------------------------------------------------------------  --------------  SCRIPT ANSWERS  Relationship to Patient? Self

## 2024-03-18 ENCOUNTER — TELEPHONE (OUTPATIENT)
Age: 69
End: 2024-03-18

## 2024-03-18 ENCOUNTER — OFFICE VISIT (OUTPATIENT)
Age: 69
End: 2024-03-18
Payer: MEDICARE

## 2024-03-18 VITALS
SYSTOLIC BLOOD PRESSURE: 120 MMHG | TEMPERATURE: 97.9 F | OXYGEN SATURATION: 96 % | BODY MASS INDEX: 22.13 KG/M2 | HEART RATE: 69 BPM | HEIGHT: 68 IN | RESPIRATION RATE: 16 BRPM | DIASTOLIC BLOOD PRESSURE: 72 MMHG | WEIGHT: 146 LBS

## 2024-03-18 DIAGNOSIS — Z01.818 PRE-OP EVALUATION: ICD-10-CM

## 2024-03-18 DIAGNOSIS — Z01.818 PRE-OP EVALUATION: Primary | ICD-10-CM

## 2024-03-18 LAB
ANION GAP SERPL CALC-SCNC: 5 MMOL/L (ref 5–15)
BASOPHILS # BLD: 0.1 K/UL (ref 0–0.1)
BASOPHILS NFR BLD: 1 % (ref 0–1)
BUN SERPL-MCNC: 14 MG/DL (ref 6–20)
BUN/CREAT SERPL: 18 (ref 12–20)
CALCIUM SERPL-MCNC: 9.5 MG/DL (ref 8.5–10.1)
CHLORIDE SERPL-SCNC: 109 MMOL/L (ref 97–108)
CO2 SERPL-SCNC: 29 MMOL/L (ref 21–32)
CREAT SERPL-MCNC: 0.77 MG/DL (ref 0.55–1.02)
DIFFERENTIAL METHOD BLD: NORMAL
EOSINOPHIL # BLD: 0.3 K/UL (ref 0–0.4)
EOSINOPHIL NFR BLD: 5 % (ref 0–7)
ERYTHROCYTE [DISTWIDTH] IN BLOOD BY AUTOMATED COUNT: 12.9 % (ref 11.5–14.5)
GLUCOSE SERPL-MCNC: 95 MG/DL (ref 65–100)
HCT VFR BLD AUTO: 39.7 % (ref 35–47)
HGB BLD-MCNC: 12.8 G/DL (ref 11.5–16)
IMM GRANULOCYTES # BLD AUTO: 0 K/UL (ref 0–0.04)
IMM GRANULOCYTES NFR BLD AUTO: 0 % (ref 0–0.5)
INR PPP: 1 (ref 0.9–1.1)
LYMPHOCYTES # BLD: 1.8 K/UL (ref 0.8–3.5)
LYMPHOCYTES NFR BLD: 28 % (ref 12–49)
MCH RBC QN AUTO: 31.2 PG (ref 26–34)
MCHC RBC AUTO-ENTMCNC: 32.2 G/DL (ref 30–36.5)
MCV RBC AUTO: 96.8 FL (ref 80–99)
MONOCYTES # BLD: 0.6 K/UL (ref 0–1)
MONOCYTES NFR BLD: 9 % (ref 5–13)
NEUTS SEG # BLD: 3.5 K/UL (ref 1.8–8)
NEUTS SEG NFR BLD: 57 % (ref 32–75)
NRBC # BLD: 0 K/UL (ref 0–0.01)
NRBC BLD-RTO: 0 PER 100 WBC
PLATELET # BLD AUTO: 253 K/UL (ref 150–400)
PMV BLD AUTO: 11.6 FL (ref 8.9–12.9)
POTASSIUM SERPL-SCNC: 4 MMOL/L (ref 3.5–5.1)
PROTHROMBIN TIME: 10.3 SEC (ref 9–11.1)
RBC # BLD AUTO: 4.1 M/UL (ref 3.8–5.2)
SODIUM SERPL-SCNC: 143 MMOL/L (ref 136–145)
WBC # BLD AUTO: 6.2 K/UL (ref 3.6–11)

## 2024-03-18 PROCEDURE — 1036F TOBACCO NON-USER: CPT | Performed by: NURSE PRACTITIONER

## 2024-03-18 PROCEDURE — 1090F PRES/ABSN URINE INCON ASSESS: CPT | Performed by: NURSE PRACTITIONER

## 2024-03-18 PROCEDURE — 1123F ACP DISCUSS/DSCN MKR DOCD: CPT | Performed by: NURSE PRACTITIONER

## 2024-03-18 PROCEDURE — 93005 ELECTROCARDIOGRAM TRACING: CPT | Performed by: NURSE PRACTITIONER

## 2024-03-18 PROCEDURE — 93010 ELECTROCARDIOGRAM REPORT: CPT | Performed by: NURSE PRACTITIONER

## 2024-03-18 PROCEDURE — G8484 FLU IMMUNIZE NO ADMIN: HCPCS | Performed by: NURSE PRACTITIONER

## 2024-03-18 PROCEDURE — 99214 OFFICE O/P EST MOD 30 MIN: CPT | Performed by: NURSE PRACTITIONER

## 2024-03-18 PROCEDURE — G8427 DOCREV CUR MEDS BY ELIG CLIN: HCPCS | Performed by: NURSE PRACTITIONER

## 2024-03-18 PROCEDURE — G8399 PT W/DXA RESULTS DOCUMENT: HCPCS | Performed by: NURSE PRACTITIONER

## 2024-03-18 PROCEDURE — 3017F COLORECTAL CA SCREEN DOC REV: CPT | Performed by: NURSE PRACTITIONER

## 2024-03-18 PROCEDURE — G8420 CALC BMI NORM PARAMETERS: HCPCS | Performed by: NURSE PRACTITIONER

## 2024-03-18 RX ORDER — ALPRAZOLAM 1 MG/1
1 TABLET ORAL NIGHTLY PRN
COMMUNITY
End: 2024-03-19 | Stop reason: SDUPTHER

## 2024-03-18 ASSESSMENT — PATIENT HEALTH QUESTIONNAIRE - PHQ9
SUM OF ALL RESPONSES TO PHQ QUESTIONS 1-9: 0
1. LITTLE INTEREST OR PLEASURE IN DOING THINGS: NOT AT ALL
SUM OF ALL RESPONSES TO PHQ9 QUESTIONS 1 & 2: 0
SUM OF ALL RESPONSES TO PHQ QUESTIONS 1-9: 0
2. FEELING DOWN, DEPRESSED OR HOPELESS: NOT AT ALL

## 2024-03-18 NOTE — PROGRESS NOTES
SCANNED DOCUMENT.      Based upon the examination performed today, no contraindications have been noted and Cynthia Benites is an acceptable risk for the requested proposed procedure.  Please proceed with the surgery as scheduled.

## 2024-03-19 DIAGNOSIS — F41.9 ANXIETY: Primary | ICD-10-CM

## 2024-03-19 RX ORDER — ALPRAZOLAM 1 MG/1
1 TABLET ORAL NIGHTLY PRN
Qty: 20 TABLET | Refills: 0 | Status: SHIPPED | OUTPATIENT
Start: 2024-03-19 | End: 2024-03-22 | Stop reason: SDUPTHER

## 2024-03-22 DIAGNOSIS — F41.9 ANXIETY: ICD-10-CM

## 2024-03-22 RX ORDER — ALPRAZOLAM 1 MG/1
1 TABLET ORAL NIGHTLY PRN
Qty: 20 TABLET | Refills: 0 | Status: SHIPPED | OUTPATIENT
Start: 2024-03-22 | End: 2024-04-11

## 2024-03-22 NOTE — TELEPHONE ENCOUNTER
Patient states that the pharmacy received 2 different dosing instructions so they will not fill the Xanax prescription. Please contact the patient at [phone]

## 2024-05-01 ENCOUNTER — OFFICE VISIT (OUTPATIENT)
Age: 69
End: 2024-05-01
Payer: MEDICARE

## 2024-05-01 VITALS
SYSTOLIC BLOOD PRESSURE: 107 MMHG | HEART RATE: 73 BPM | HEIGHT: 68 IN | OXYGEN SATURATION: 97 % | DIASTOLIC BLOOD PRESSURE: 73 MMHG | BODY MASS INDEX: 21.37 KG/M2 | RESPIRATION RATE: 16 BRPM | TEMPERATURE: 97.4 F | WEIGHT: 141 LBS

## 2024-05-01 DIAGNOSIS — Q45.3 PANCREAS DIVISUM, NATIVE: ICD-10-CM

## 2024-05-01 DIAGNOSIS — Z00.00 MEDICARE ANNUAL WELLNESS VISIT, SUBSEQUENT: Primary | ICD-10-CM

## 2024-05-01 DIAGNOSIS — E03.9 ACQUIRED HYPOTHYROIDISM: ICD-10-CM

## 2024-05-01 DIAGNOSIS — F51.02 ADJUSTMENT INSOMNIA: ICD-10-CM

## 2024-05-01 PROCEDURE — 99214 OFFICE O/P EST MOD 30 MIN: CPT | Performed by: INTERNAL MEDICINE

## 2024-05-01 PROCEDURE — 1123F ACP DISCUSS/DSCN MKR DOCD: CPT | Performed by: INTERNAL MEDICINE

## 2024-05-01 PROCEDURE — G8420 CALC BMI NORM PARAMETERS: HCPCS | Performed by: INTERNAL MEDICINE

## 2024-05-01 PROCEDURE — G0439 PPPS, SUBSEQ VISIT: HCPCS | Performed by: INTERNAL MEDICINE

## 2024-05-01 PROCEDURE — G8427 DOCREV CUR MEDS BY ELIG CLIN: HCPCS | Performed by: INTERNAL MEDICINE

## 2024-05-01 PROCEDURE — 1036F TOBACCO NON-USER: CPT | Performed by: INTERNAL MEDICINE

## 2024-05-01 PROCEDURE — G8399 PT W/DXA RESULTS DOCUMENT: HCPCS | Performed by: INTERNAL MEDICINE

## 2024-05-01 PROCEDURE — 1090F PRES/ABSN URINE INCON ASSESS: CPT | Performed by: INTERNAL MEDICINE

## 2024-05-01 PROCEDURE — 3017F COLORECTAL CA SCREEN DOC REV: CPT | Performed by: INTERNAL MEDICINE

## 2024-05-01 RX ORDER — ALPRAZOLAM 0.5 MG/1
TABLET ORAL
Qty: 30 TABLET | Refills: 2 | Status: SHIPPED | OUTPATIENT
Start: 2024-05-01 | End: 2024-11-01

## 2024-05-01 RX ORDER — ALPRAZOLAM 1 MG/1
1 TABLET ORAL NIGHTLY PRN
COMMUNITY
End: 2024-05-01 | Stop reason: DRUGHIGH

## 2024-05-01 ASSESSMENT — PATIENT HEALTH QUESTIONNAIRE - PHQ9
SUM OF ALL RESPONSES TO PHQ QUESTIONS 1-9: 0
1. LITTLE INTEREST OR PLEASURE IN DOING THINGS: NOT AT ALL
SUM OF ALL RESPONSES TO PHQ QUESTIONS 1-9: 0
SUM OF ALL RESPONSES TO PHQ QUESTIONS 1-9: 0
SUM OF ALL RESPONSES TO PHQ9 QUESTIONS 1 & 2: 0
2. FEELING DOWN, DEPRESSED OR HOPELESS: NOT AT ALL
SUM OF ALL RESPONSES TO PHQ QUESTIONS 1-9: 0

## 2024-05-01 ASSESSMENT — LIFESTYLE VARIABLES
HOW OFTEN DO YOU HAVE A DRINK CONTAINING ALCOHOL: NEVER
HOW MANY STANDARD DRINKS CONTAINING ALCOHOL DO YOU HAVE ON A TYPICAL DAY: PATIENT DOES NOT DRINK

## 2024-05-01 NOTE — PROGRESS NOTES
Cynthia Benites (:  1955) is a 68 y.o. female,Established patient, here for evaluation of the following chief complaint(s):  Medicare AWV (Patient is fasting)      Assessment & Plan   1. Medicare annual wellness visit, subsequent  2. Acquired hypothyroidism-cont 88 mcg  -     TSH; Future  -     T4, Free; Future  -     Lipid Panel; Future  3. Pancreas divisum, native-no recent episodes of pancreatitis discussed diet  -     Comprehensive Metabolic Panel; Future  4. Adjustment insomnia-discussion as below  -     ALPRAZolam (XANAX) 0.5 MG tablet; /2-1 qhs prn, Disp-30 tablet, R-2Normal      No follow-ups on file.       Subjective   HPI  Medicare well visit follow-up on concerns    Chronic insomnia using a quarter of a 1 mg Xanax essentially every evening for sleep and endorses good results will change to the 0.5 that she can cut in half more easily and encourage skip once a week.    Thyroid on 88 mcg dose no symptoms of hypo or hyperthyroidism    History of pancreas divisum with previous pancreatitis she is following a very clean diet essentially no alcohol has had no flares in over a year does see Dr. Eagle    Preventive care bone density  mammogram  Pap smear  social history daughter Kyra now has a daughter Harsha and his son Emiliano son has 2 children happy in [place] she does water aerobics pickleball golfing no tobacco use  Review of Systems       Objective   Physical Exam  Constitutional:       Appearance: Normal appearance.   HENT:      Head: Normocephalic and atraumatic.      Right Ear: Tympanic membrane normal.      Left Ear: Tympanic membrane normal.   Cardiovascular:      Rate and Rhythm: Normal rate and regular rhythm.   Pulmonary:      Effort: Pulmonary effort is normal.      Breath sounds: Normal breath sounds.   Abdominal:      General: There is no distension.      Palpations: Abdomen is soft. There is no mass.      Tenderness: There is no abdominal tenderness.   Musculoskeletal:

## 2024-05-01 NOTE — PATIENT INSTRUCTIONS

## 2024-05-02 LAB
ALBUMIN SERPL-MCNC: 3.8 G/DL (ref 3.5–5)
ALBUMIN/GLOB SERPL: 1.2 (ref 1.1–2.2)
ALP SERPL-CCNC: 134 U/L (ref 45–117)
ALT SERPL-CCNC: 37 U/L (ref 12–78)
ANION GAP SERPL CALC-SCNC: 5 MMOL/L (ref 5–15)
AST SERPL-CCNC: 49 U/L (ref 15–37)
BILIRUB SERPL-MCNC: 0.8 MG/DL (ref 0.2–1)
BUN SERPL-MCNC: 15 MG/DL (ref 6–20)
BUN/CREAT SERPL: 20 (ref 12–20)
CALCIUM SERPL-MCNC: 9.4 MG/DL (ref 8.5–10.1)
CHLORIDE SERPL-SCNC: 104 MMOL/L (ref 97–108)
CHOLEST SERPL-MCNC: 137 MG/DL
CO2 SERPL-SCNC: 27 MMOL/L (ref 21–32)
CREAT SERPL-MCNC: 0.75 MG/DL (ref 0.55–1.02)
GLOBULIN SER CALC-MCNC: 3.3 G/DL (ref 2–4)
GLUCOSE SERPL-MCNC: 86 MG/DL (ref 65–100)
HDLC SERPL-MCNC: 54 MG/DL
HDLC SERPL: 2.5 (ref 0–5)
LDLC SERPL CALC-MCNC: 75.6 MG/DL (ref 0–100)
POTASSIUM SERPL-SCNC: 5.2 MMOL/L (ref 3.5–5.1)
PROT SERPL-MCNC: 7.1 G/DL (ref 6.4–8.2)
SODIUM SERPL-SCNC: 136 MMOL/L (ref 136–145)
T4 FREE SERPL-MCNC: 1.3 NG/DL (ref 0.8–1.5)
TRIGL SERPL-MCNC: 37 MG/DL
TSH SERPL DL<=0.05 MIU/L-ACNC: 2.18 UIU/ML (ref 0.36–3.74)
VLDLC SERPL CALC-MCNC: 7.4 MG/DL

## 2024-07-29 DIAGNOSIS — E03.9 ACQUIRED HYPOTHYROIDISM: Primary | ICD-10-CM

## 2024-07-29 RX ORDER — LEVOTHYROXINE SODIUM 88 MCG
88 TABLET ORAL EVERY MORNING
Qty: 90 TABLET | Refills: 2 | Status: SHIPPED | OUTPATIENT
Start: 2024-07-29

## 2024-10-16 ENCOUNTER — TRANSCRIBE ORDERS (OUTPATIENT)
Facility: HOSPITAL | Age: 69
End: 2024-10-16

## 2024-10-16 DIAGNOSIS — Z12.31 VISIT FOR SCREENING MAMMOGRAM: Primary | ICD-10-CM

## 2024-11-05 ENCOUNTER — HOSPITAL ENCOUNTER (OUTPATIENT)
Age: 69
Discharge: HOME | End: 2024-11-08
Payer: MEDICARE

## 2024-11-05 VITALS — HEIGHT: 68 IN | BODY MASS INDEX: 21.07 KG/M2 | WEIGHT: 139 LBS

## 2024-11-05 DIAGNOSIS — Z12.31 VISIT FOR SCREENING MAMMOGRAM: ICD-10-CM

## 2024-11-05 PROCEDURE — 77063 BREAST TOMOSYNTHESIS BI: CPT

## 2024-11-26 ENCOUNTER — HOSPITAL ENCOUNTER (OUTPATIENT)
Age: 69
Discharge: HOME | End: 2024-11-29
Payer: MEDICARE

## 2024-11-26 DIAGNOSIS — J32.0 CHRONIC MAXILLARY SINUSITIS: ICD-10-CM

## 2024-11-26 PROCEDURE — 70486 CT MAXILLOFACIAL W/O DYE: CPT

## 2024-12-02 ENCOUNTER — OFFICE VISIT (OUTPATIENT)
Facility: CLINIC | Age: 69
End: 2024-12-02
Payer: MEDICARE

## 2024-12-02 VITALS
TEMPERATURE: 97.7 F | BODY MASS INDEX: 21.22 KG/M2 | RESPIRATION RATE: 16 BRPM | OXYGEN SATURATION: 98 % | SYSTOLIC BLOOD PRESSURE: 128 MMHG | WEIGHT: 140 LBS | DIASTOLIC BLOOD PRESSURE: 80 MMHG | HEART RATE: 75 BPM | HEIGHT: 68 IN

## 2024-12-02 DIAGNOSIS — E87.5 HYPERKALEMIA: ICD-10-CM

## 2024-12-02 DIAGNOSIS — F43.21 GRIEF: ICD-10-CM

## 2024-12-02 DIAGNOSIS — E03.9 ACQUIRED HYPOTHYROIDISM: Primary | ICD-10-CM

## 2024-12-02 DIAGNOSIS — F51.02 ADJUSTMENT INSOMNIA: ICD-10-CM

## 2024-12-02 DIAGNOSIS — E03.9 ACQUIRED HYPOTHYROIDISM: ICD-10-CM

## 2024-12-02 DIAGNOSIS — Q45.3 PANCREAS DIVISUM, NATIVE: ICD-10-CM

## 2024-12-02 PROCEDURE — 1090F PRES/ABSN URINE INCON ASSESS: CPT | Performed by: INTERNAL MEDICINE

## 2024-12-02 PROCEDURE — 1126F AMNT PAIN NOTED NONE PRSNT: CPT | Performed by: INTERNAL MEDICINE

## 2024-12-02 PROCEDURE — 3017F COLORECTAL CA SCREEN DOC REV: CPT | Performed by: INTERNAL MEDICINE

## 2024-12-02 PROCEDURE — G8420 CALC BMI NORM PARAMETERS: HCPCS | Performed by: INTERNAL MEDICINE

## 2024-12-02 PROCEDURE — G8484 FLU IMMUNIZE NO ADMIN: HCPCS | Performed by: INTERNAL MEDICINE

## 2024-12-02 PROCEDURE — 1123F ACP DISCUSS/DSCN MKR DOCD: CPT | Performed by: INTERNAL MEDICINE

## 2024-12-02 PROCEDURE — G8399 PT W/DXA RESULTS DOCUMENT: HCPCS | Performed by: INTERNAL MEDICINE

## 2024-12-02 PROCEDURE — 99214 OFFICE O/P EST MOD 30 MIN: CPT | Performed by: INTERNAL MEDICINE

## 2024-12-02 PROCEDURE — 1036F TOBACCO NON-USER: CPT | Performed by: INTERNAL MEDICINE

## 2024-12-02 PROCEDURE — G8428 CUR MEDS NOT DOCUMENT: HCPCS | Performed by: INTERNAL MEDICINE

## 2024-12-02 RX ORDER — ESCITALOPRAM OXALATE 5 MG/1
2.5 TABLET ORAL DAILY
Qty: 45 TABLET | Refills: 2 | Status: SHIPPED | OUTPATIENT
Start: 2024-12-02

## 2024-12-02 RX ORDER — ALPRAZOLAM 0.5 MG
TABLET ORAL
Qty: 30 TABLET | Refills: 2 | Status: SHIPPED | OUTPATIENT
Start: 2024-12-02 | End: 2025-05-02

## 2024-12-02 RX ORDER — CONJUGATED ESTROGENS 0.62 MG/G
1 CREAM VAGINAL
Qty: 30 G | Refills: 1 | Status: SHIPPED | OUTPATIENT
Start: 2024-12-02

## 2024-12-02 SDOH — ECONOMIC STABILITY: FOOD INSECURITY: WITHIN THE PAST 12 MONTHS, YOU WORRIED THAT YOUR FOOD WOULD RUN OUT BEFORE YOU GOT MONEY TO BUY MORE.: NEVER TRUE

## 2024-12-02 SDOH — ECONOMIC STABILITY: INCOME INSECURITY: HOW HARD IS IT FOR YOU TO PAY FOR THE VERY BASICS LIKE FOOD, HOUSING, MEDICAL CARE, AND HEATING?: NOT HARD AT ALL

## 2024-12-02 SDOH — ECONOMIC STABILITY: FOOD INSECURITY: WITHIN THE PAST 12 MONTHS, THE FOOD YOU BOUGHT JUST DIDN'T LAST AND YOU DIDN'T HAVE MONEY TO GET MORE.: NEVER TRUE

## 2024-12-02 NOTE — PROGRESS NOTES
Cynthia Benites (:  1955) is a 69 y.o. female,Established patient, here for evaluation of the following chief complaint(s):  Medication Check (Medication check )         Assessment & Plan  Acquired hypothyroidism   Continue the 88 mcg dose  no sxs of hypo    Orders:  •  TSH; Future  •  T4, Free; Future    Pancreas divisum, native   No recent sxs of pancreatitis         Hyperkalemia   K 5.2-on no meds to cause this   Suspect lab variation repeat today    Orders:  •  Comprehensive Metabolic Panel; Future    Grief   Loss of  last month  Tearful and feels a weight on her chest  Start low dose ssri      Orders:  •  escitalopram (LEXAPRO) 5 MG tablet; Take 0.5 tablets by mouth daily    Adjustment insomnia   Using 45 tablets in 6  months  This is a big decrease from prior use  congratulated    Orders:  •  ALPRAZolam (XANAX) 0.5 MG tablet; 1/2-1 po qhs  prn      No follow-ups on file.       Subjective   HPI  Sadly last month her  passed away unexpectedly with an MI and sepsis.  She is grieving.  Feels good support from family and friends.  Gets tearful easily.  Feels as if she is walking around with a weight on her chest.  Some anxiety around finances taking over management.  Has limited her Xanax use.  Since prescription written in May she has used approximately 45 tablets of the 0.5 mg.  Uses it at night to help with sleep and to quiet her brain.  Has not escalated use.    No alcohol consumption and no recent episodes of pancreatitis.  No abdominal symptoms.  Discussed benefit of estrogen cream to prevent UTIs.  No recent symptoms.  Review of Systems       Objective   Physical Exam  Constitutional:       Appearance: Normal appearance.   HENT:      Head: Normocephalic and atraumatic.   Cardiovascular:      Rate and Rhythm: Normal rate and regular rhythm.   Pulmonary:      Effort: Pulmonary effort is normal.      Breath sounds: Normal breath sounds.   Musculoskeletal:      Right lower leg: No edema.

## 2024-12-04 LAB
ALBUMIN SERPL-MCNC: 3.9 G/DL (ref 3.5–5)
ALBUMIN/GLOB SERPL: 1.1 (ref 1.1–2.2)
ALP SERPL-CCNC: 121 U/L (ref 45–117)
ALT SERPL-CCNC: 28 U/L (ref 12–78)
ANION GAP SERPL CALC-SCNC: 5 MMOL/L (ref 2–12)
AST SERPL-CCNC: 30 U/L (ref 15–37)
BILIRUB SERPL-MCNC: 0.8 MG/DL (ref 0.2–1)
BUN SERPL-MCNC: 14 MG/DL (ref 6–20)
BUN/CREAT SERPL: 15 (ref 12–20)
CALCIUM SERPL-MCNC: 9.5 MG/DL (ref 8.5–10.1)
CHLORIDE SERPL-SCNC: 108 MMOL/L (ref 97–108)
CO2 SERPL-SCNC: 27 MMOL/L (ref 21–32)
CREAT SERPL-MCNC: 0.94 MG/DL (ref 0.55–1.02)
GLOBULIN SER CALC-MCNC: 3.4 G/DL (ref 2–4)
GLUCOSE SERPL-MCNC: 104 MG/DL (ref 65–100)
POTASSIUM SERPL-SCNC: 4.5 MMOL/L (ref 3.5–5.1)
PROT SERPL-MCNC: 7.3 G/DL (ref 6.4–8.2)
SODIUM SERPL-SCNC: 140 MMOL/L (ref 136–145)
T4 FREE SERPL-MCNC: 1.3 NG/DL (ref 0.8–1.5)
TSH SERPL DL<=0.05 MIU/L-ACNC: 1.32 UIU/ML (ref 0.36–3.74)

## 2024-12-19 ENCOUNTER — OFFICE VISIT (OUTPATIENT)
Age: 69
End: 2024-12-19

## 2024-12-19 VITALS
TEMPERATURE: 98.7 F | OXYGEN SATURATION: 99 % | SYSTOLIC BLOOD PRESSURE: 136 MMHG | HEIGHT: 68 IN | BODY MASS INDEX: 21.82 KG/M2 | HEART RATE: 61 BPM | WEIGHT: 144 LBS | DIASTOLIC BLOOD PRESSURE: 81 MMHG

## 2024-12-19 DIAGNOSIS — B96.89 ACUTE BACTERIAL SINUSITIS: Primary | ICD-10-CM

## 2024-12-19 DIAGNOSIS — J01.90 ACUTE BACTERIAL SINUSITIS: Primary | ICD-10-CM

## 2024-12-19 PROBLEM — G57.62 MORTON'S NEUROMA, LEFT: Status: ACTIVE | Noted: 2024-01-10

## 2024-12-19 PROBLEM — G45.4 TRANSIENT GLOBAL AMNESIA: Status: ACTIVE | Noted: 2024-12-19

## 2024-12-19 PROBLEM — K86.89 ACUTE PANCREATIC FLUID COLLECTION: Status: ACTIVE | Noted: 2024-04-03

## 2024-12-19 PROBLEM — G25.81 RESTLESS LEGS SYNDROME: Status: ACTIVE | Noted: 2024-12-19

## 2024-12-19 RX ORDER — DOXYCYCLINE HYCLATE 100 MG
100 TABLET ORAL 2 TIMES DAILY
Qty: 14 TABLET | Refills: 0 | Status: SHIPPED | OUTPATIENT
Start: 2024-12-19 | End: 2024-12-26

## 2024-12-19 ASSESSMENT — ENCOUNTER SYMPTOMS
SINUS PRESSURE: 1
SINUS PAIN: 1

## 2024-12-19 NOTE — PROGRESS NOTES
Emergency Department Attending Note    Patient: Catalina Valverde Age: 11 year old Sex: female   MRN: [id number] : 2011 Encounter Date: 2023     PPI:  gloves and mask    HISTORY OF PRESENT ILLNESS  This is a 11 year old female here after minor head injury today.  Patient is with her mother who speaks primarily [place], and a fluent video [place] interpreting service was used communicate with the mother.  Mother states that the patient slipped on stairs, hit her side of her head against the stair, and there was an area of soft tissue swelling, mother was concerned and brought her in for evaluation.  This occurred at approximately 11 AM, more than 6 hours prior to arrival.  Mother was concerned because there was an area of swelling that the child may need imaging.  Child did not have any loss of consciousness, did not fall down the stairs, has no headache except for localized pain at the site of the impact, denies any neck pain, has no neurologic symptoms, had no vomiting, has had no change in behavior, and has had no other injury.        PAST HISTORY         No past medical history, immunizations up-to-date No past surgical history on file.          No family history on file.            There are no discharge medications for this patient.    No Known Allergies       PHYSICAL EXAMINATION  ED Triage Vitals [23 1726]   ED Triage Vitals Group      Temp 98.4 °F (36.9 °C)      Heart Rate 78      Resp 22      /62      SpO2 99 %      EtCO2 mmHg       Height       Weight 82 lb 3.7 oz (37.3 kg)      Weight Scale Used Standing scale      BMI (Calculated)       IBW/kg (Calculated)         GEN: Patient is generally well-appearing, in no acute distress  HEENT: Normal TMs, no hemotympanum, moist mucous membranes, posterior right scalp with approximately 2 x 2 centimeter area of mild soft tissue swelling, tenderness, but no deformity, able to palpate the skull, no step-off  NECK:  Supple, NT cervical  Subjective     Chief Complaint   Patient presents with    Sinusitis     Sinus headache, body aches, discolored mucus x 3-4 days        Patient is 69 year old female presenting with possible sinus infection.  She reports symptoms began approximately one week ago.  Denies fever but endorses chills.  She has history of sinus infections and has been using nasal washes with some relief.  She endorses discolored nasal discharge.      Sinusitis  Associated symptoms include chills and sinus pressure.       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 ENDOSCOPY    FOOT SURGERY Right 03/2024    HEENT      wisdom teeth extraction    KNEE ARTHROSCOPY Left 2019    KNEE ARTHROSCOPY Right 01/18/2024    RIGHT KNEE ARTHROSCOPIC PARTIAL LATERAL MENISCECTOMY, LATERAL FEMORAL CONDYLE CONDROPLASTY performed by Chanda Sanchez MD (Jody) at [facility] AMBULATORY OR    ORTHOPEDIC SURGERY  1/2016    right 3rd finger trigger release    TONSILLECTOMY      TUBAL LIGATION      VASCULAR SURGERY      insertion of Greenfielf filter       Family History   Problem Relation Age of Onset    Cancer Mother         breast CA 60 anad 70,COPD 72    Lung Disease Mother     Heart Failure Father         mi age 58    Heart Disease Father     Hypertension Father     Cancer Sister         breast ca 48    Thyroid Disease Sister     Cancer Sister         thyroid cancer    Diabetes Sister         Type 1 DM    Heart Attack Sister     Thyroid Disease Brother     Thyroid Disease  spine, no adenopathy or meningismus  CV: Regular rate and rhythm, no murmur, no tachycardia, normal distal symmetric pulses and capillary refilll bilaterally  PULM: No increased work of breathing, no tachypnea, lungs are clear to auscultation bilaterally, no wheezes or rales  GI: Abdomen is soft, nontender  MSK: No extremity tenderness  BACK:  Nontender spine  SKIN: No abrasions or lacerations  NEURO: Normal strength bilaterally for age, normal sensation bilaterally, normal gait, normal tandem gait  PSYCH: Displays appropriate mood    Labs: No results found for this visit on 03/31/23.  Imaging:   No orders to display         MEDICAL DECISION MAKING  This is a 11 year old female with mild head injury, looks great, normal neurologic exam, trauma occurred earlier in the day, and I discussed with mother that I would not recommend imaging at this time given the danger of ionizing radiation exposure and the fact that the patient looks very well now, PECARN screening decision instrument is negative.  To return if she does develop severe headache, vomiting, change in behavior, otherwise Tylenol or ibuprofen for pain as needed and primary care follow-up.  Mother is very comfortable with this plan.       ED Medication Orders (From admission, onward)    Ordered Start     Status Ordering Provider    03/31/23 2006 03/31/23 2007  ibuprofen (CHILDRENS ADVIL) 100 MG/5ML suspension 374 mg  ONCE         Last MAR action: Given FERNANDO MCPHERSON          IMPRESSION AND PLAN  ED Diagnosis   1. Closed head injury without loss of consciousness, initial encounter          Disposition: Discharge home in stable condition     Teaching-Supervisory Addendum-Brief      I participated in the following activities of this patients care: the medical history, the physical exam, medical decision making, the procedure.    I personally performed: supervision of the patient's care, the medical history, the physical exam, the medical decision making.    The  case was discussed with: the resident.    Procedures: I directly supervised the entire procedure.    Evaluation and management service: I agree with the evaluation and management decisions made in this patient's care.    Results interpretation: I agree with the study interpretation in this patient's care.             MD Vielka Coburn MD  04/01/23 0104

## 2024-12-19 NOTE — PATIENT INSTRUCTIONS
Thank you for visiting [facility] today.    Flonase (over the counter) nasal spray, once a day  Saline nasal sprays 1-3 days   Afrin nasal spray for no longer than 3-5 days  Ibuprofen (400-800 mg) every 8 hours; Tylenol 325-500 mg every 6 hours)   Zyrtec/Xyzal/Allegra/Claritin during the day or Benadryl at night may help with allergies.  You may use the decongestant version of these medications as well.  Simple foods like chicken noodle soup, smoothies, hot tea with lemon and honey may also help  Steam inhalation, humidifier, warm compresses  Increase oral fluids to maintain hydration  Vitamin D 4000 IU each day for one month  1/2 teaspoon turmeric each day for anti-inflammation  Avoid smoking and minimize contact with environmental irritants  Antibiotics with food and probiotics     Please follow up with your primary care provider if your symptoms last more than 10 days or worsen.    Please go immediately to the Emergency Department if you develop:  Fever higher than 102F (38.9C), sudden and severe pain in the face and head, trouble seeing or seeing double, trouble thinking clearly, swelling or redness around one or both eyes or a stiff neck

## 2025-04-28 DIAGNOSIS — E03.9 ACQUIRED HYPOTHYROIDISM: ICD-10-CM

## 2025-04-28 RX ORDER — LEVOTHYROXINE SODIUM 88 MCG
88 TABLET ORAL EVERY MORNING
Qty: 90 TABLET | Refills: 2 | Status: SHIPPED | OUTPATIENT
Start: 2025-04-28

## 2025-05-12 ENCOUNTER — HOSPITAL ENCOUNTER (INPATIENT)
Facility: HOSPITAL | Age: 70
LOS: 2 days | Discharge: HOME | DRG: 440 | End: 2025-05-14
Attending: STUDENT IN AN ORGANIZED HEALTH CARE EDUCATION/TRAINING PROGRAM | Admitting: FAMILY MEDICINE
Payer: MEDICARE

## 2025-05-12 ENCOUNTER — APPOINTMENT (OUTPATIENT)
Facility: HOSPITAL | Age: 70
DRG: 440 | End: 2025-05-12
Payer: MEDICARE

## 2025-05-12 ENCOUNTER — TELEPHONE (OUTPATIENT)
Facility: CLINIC | Age: 70
End: 2025-05-12

## 2025-05-12 DIAGNOSIS — R07.9 ACUTE CHEST PAIN: ICD-10-CM

## 2025-05-12 DIAGNOSIS — K85.90 ACUTE PANCREATITIS WITHOUT INFECTION OR NECROSIS, UNSPECIFIED PANCREATITIS TYPE: Primary | ICD-10-CM

## 2025-05-12 LAB
ALBUMIN SERPL-MCNC: 3.3 G/DL (ref 3.5–5)
ALBUMIN/GLOB SERPL: 1 (ref 1.1–2.2)
ALP SERPL-CCNC: 116 U/L (ref 45–117)
ALT SERPL-CCNC: 28 U/L (ref 12–78)
ANION GAP SERPL CALC-SCNC: 5 MMOL/L (ref 2–12)
AST SERPL-CCNC: 33 U/L (ref 15–37)
BASOPHILS # BLD: 0.05 K/UL (ref 0–0.1)
BASOPHILS NFR BLD: 0.7 % (ref 0–1)
BILIRUB SERPL-MCNC: 0.5 MG/DL (ref 0.2–1)
BUN SERPL-MCNC: 16 MG/DL (ref 6–20)
BUN/CREAT SERPL: 21 (ref 12–20)
CALCIUM SERPL-MCNC: 9.5 MG/DL (ref 8.5–10.1)
CHLORIDE SERPL-SCNC: 109 MMOL/L (ref 97–108)
CHOLEST SERPL-MCNC: 155 MG/DL
CO2 SERPL-SCNC: 26 MMOL/L (ref 21–32)
COMMENT:: NORMAL
CREAT SERPL-MCNC: 0.77 MG/DL (ref 0.55–1.02)
DIFFERENTIAL METHOD BLD: NORMAL
EOSINOPHIL # BLD: 0.36 K/UL (ref 0–0.4)
EOSINOPHIL NFR BLD: 5 % (ref 0–7)
ERYTHROCYTE [DISTWIDTH] IN BLOOD BY AUTOMATED COUNT: 12.3 % (ref 11.5–14.5)
GLOBULIN SER CALC-MCNC: 3.4 G/DL (ref 2–4)
GLUCOSE SERPL-MCNC: 116 MG/DL (ref 65–100)
HCT VFR BLD AUTO: 37.2 % (ref 35–47)
HDLC SERPL-MCNC: 51 MG/DL
HDLC SERPL: 3 (ref 0–5)
HGB BLD-MCNC: 12.3 G/DL (ref 11.5–16)
IMM GRANULOCYTES # BLD AUTO: 0.03 K/UL (ref 0–0.04)
IMM GRANULOCYTES NFR BLD AUTO: 0.4 % (ref 0–0.5)
LDLC SERPL CALC-MCNC: 93.8 MG/DL (ref 0–100)
LIPASE SERPL-CCNC: >5000 U/L (ref 13–75)
LYMPHOCYTES # BLD: 1.38 K/UL (ref 0.8–3.5)
LYMPHOCYTES NFR BLD: 19.1 % (ref 12–49)
MCH RBC QN AUTO: 30.6 PG (ref 26–34)
MCHC RBC AUTO-ENTMCNC: 33.1 G/DL (ref 30–36.5)
MCV RBC AUTO: 92.5 FL (ref 80–99)
MONOCYTES # BLD: 0.6 K/UL (ref 0–1)
MONOCYTES NFR BLD: 8.3 % (ref 5–13)
NEUTS SEG # BLD: 4.82 K/UL (ref 1.8–8)
NEUTS SEG NFR BLD: 66.5 % (ref 32–75)
NRBC # BLD: 0 K/UL (ref 0–0.01)
NRBC BLD-RTO: 0 PER 100 WBC
PLATELET # BLD AUTO: 251 K/UL (ref 150–400)
PMV BLD AUTO: 10.3 FL (ref 8.9–12.9)
POTASSIUM SERPL-SCNC: 3.6 MMOL/L (ref 3.5–5.1)
PROT SERPL-MCNC: 6.7 G/DL (ref 6.4–8.2)
RBC # BLD AUTO: 4.02 M/UL (ref 3.8–5.2)
SODIUM SERPL-SCNC: 140 MMOL/L (ref 136–145)
SPECIMEN HOLD: NORMAL
TRIGL SERPL-MCNC: 51 MG/DL
TROPONIN I SERPL HS-MCNC: 57 NG/L (ref 0–51)
TROPONIN I SERPL HS-MCNC: 63 NG/L (ref 0–51)
VLDLC SERPL CALC-MCNC: 10.2 MG/DL
WBC # BLD AUTO: 7.2 K/UL (ref 3.6–11)

## 2025-05-12 PROCEDURE — 6360000004 HC RX CONTRAST MEDICATION: Performed by: RADIOLOGY

## 2025-05-12 PROCEDURE — 1200000000 HC SEMI PRIVATE

## 2025-05-12 PROCEDURE — 2500000003 HC RX 250 WO HCPCS: Performed by: NURSE PRACTITIONER

## 2025-05-12 PROCEDURE — 80053 COMPREHEN METABOLIC PANEL: CPT

## 2025-05-12 PROCEDURE — 71275 CT ANGIOGRAPHY CHEST: CPT

## 2025-05-12 PROCEDURE — 2580000003 HC RX 258: Performed by: NURSE PRACTITIONER

## 2025-05-12 PROCEDURE — 74177 CT ABD & PELVIS W/CONTRAST: CPT

## 2025-05-12 PROCEDURE — 80061 LIPID PANEL: CPT

## 2025-05-12 PROCEDURE — 6360000002 HC RX W HCPCS: Performed by: STUDENT IN AN ORGANIZED HEALTH CARE EDUCATION/TRAINING PROGRAM

## 2025-05-12 PROCEDURE — 96374 THER/PROPH/DIAG INJ IV PUSH: CPT

## 2025-05-12 PROCEDURE — 83690 ASSAY OF LIPASE: CPT

## 2025-05-12 PROCEDURE — 85025 COMPLETE CBC W/AUTO DIFF WBC: CPT

## 2025-05-12 PROCEDURE — 84484 ASSAY OF TROPONIN QUANT: CPT

## 2025-05-12 PROCEDURE — 6360000002 HC RX W HCPCS: Performed by: NURSE PRACTITIONER

## 2025-05-12 PROCEDURE — 6370000000 HC RX 637 (ALT 250 FOR IP): Performed by: NURSE PRACTITIONER

## 2025-05-12 PROCEDURE — 99285 EMERGENCY DEPT VISIT HI MDM: CPT

## 2025-05-12 RX ORDER — SODIUM CHLORIDE 0.9 % (FLUSH) 0.9 %
5-40 SYRINGE (ML) INJECTION PRN
Status: DISCONTINUED | OUTPATIENT
Start: 2025-05-12 | End: 2025-05-14 | Stop reason: HOSPADM

## 2025-05-12 RX ORDER — MAGNESIUM SULFATE IN WATER 40 MG/ML
2000 INJECTION, SOLUTION INTRAVENOUS PRN
Status: DISCONTINUED | OUTPATIENT
Start: 2025-05-12 | End: 2025-05-14

## 2025-05-12 RX ORDER — ENOXAPARIN SODIUM 100 MG/ML
40 INJECTION SUBCUTANEOUS DAILY
Status: DISCONTINUED | OUTPATIENT
Start: 2025-05-12 | End: 2025-05-14 | Stop reason: HOSPADM

## 2025-05-12 RX ORDER — ONDANSETRON 2 MG/ML
4 INJECTION INTRAMUSCULAR; INTRAVENOUS EVERY 6 HOURS PRN
Status: DISCONTINUED | OUTPATIENT
Start: 2025-05-12 | End: 2025-05-14 | Stop reason: HOSPADM

## 2025-05-12 RX ORDER — SODIUM CHLORIDE, SODIUM LACTATE, POTASSIUM CHLORIDE, CALCIUM CHLORIDE 600; 310; 30; 20 MG/100ML; MG/100ML; MG/100ML; MG/100ML
INJECTION, SOLUTION INTRAVENOUS CONTINUOUS
Status: DISPENSED | OUTPATIENT
Start: 2025-05-12 | End: 2025-05-13

## 2025-05-12 RX ORDER — LEVOTHYROXINE SODIUM 88 UG/1
88 TABLET ORAL EVERY MORNING
Status: DISCONTINUED | OUTPATIENT
Start: 2025-05-12 | End: 2025-05-14 | Stop reason: HOSPADM

## 2025-05-12 RX ORDER — ACETAMINOPHEN 325 MG/1
650 TABLET ORAL EVERY 6 HOURS PRN
Status: DISCONTINUED | OUTPATIENT
Start: 2025-05-12 | End: 2025-05-14 | Stop reason: HOSPADM

## 2025-05-12 RX ORDER — ALPRAZOLAM 0.5 MG
0.25 TABLET ORAL NIGHTLY PRN
Status: DISCONTINUED | OUTPATIENT
Start: 2025-05-12 | End: 2025-05-14 | Stop reason: HOSPADM

## 2025-05-12 RX ORDER — ONDANSETRON 4 MG/1
4 TABLET, ORALLY DISINTEGRATING ORAL EVERY 8 HOURS PRN
Status: DISCONTINUED | OUTPATIENT
Start: 2025-05-12 | End: 2025-05-14 | Stop reason: HOSPADM

## 2025-05-12 RX ORDER — POLYETHYLENE GLYCOL 3350 17 G/17G
17 POWDER, FOR SOLUTION ORAL DAILY PRN
Status: DISCONTINUED | OUTPATIENT
Start: 2025-05-12 | End: 2025-05-14 | Stop reason: HOSPADM

## 2025-05-12 RX ORDER — IOPAMIDOL 755 MG/ML
100 INJECTION, SOLUTION INTRAVASCULAR
Status: COMPLETED | OUTPATIENT
Start: 2025-05-12 | End: 2025-05-12

## 2025-05-12 RX ORDER — POTASSIUM CHLORIDE 750 MG/1
40 TABLET, EXTENDED RELEASE ORAL PRN
Status: DISCONTINUED | OUTPATIENT
Start: 2025-05-12 | End: 2025-05-14

## 2025-05-12 RX ORDER — SODIUM CHLORIDE 9 MG/ML
INJECTION, SOLUTION INTRAVENOUS PRN
Status: DISCONTINUED | OUTPATIENT
Start: 2025-05-12 | End: 2025-05-14 | Stop reason: HOSPADM

## 2025-05-12 RX ORDER — POTASSIUM CHLORIDE 7.45 MG/ML
10 INJECTION INTRAVENOUS PRN
Status: DISCONTINUED | OUTPATIENT
Start: 2025-05-12 | End: 2025-05-14

## 2025-05-12 RX ORDER — ACETAMINOPHEN 650 MG/1
650 SUPPOSITORY RECTAL EVERY 6 HOURS PRN
Status: DISCONTINUED | OUTPATIENT
Start: 2025-05-12 | End: 2025-05-14 | Stop reason: HOSPADM

## 2025-05-12 RX ORDER — SODIUM CHLORIDE 0.9 % (FLUSH) 0.9 %
5-40 SYRINGE (ML) INJECTION EVERY 12 HOURS SCHEDULED
Status: DISCONTINUED | OUTPATIENT
Start: 2025-05-12 | End: 2025-05-14 | Stop reason: HOSPADM

## 2025-05-12 RX ADMIN — SODIUM CHLORIDE, PRESERVATIVE FREE 10 ML: 5 INJECTION INTRAVENOUS at 21:06

## 2025-05-12 RX ADMIN — IOPAMIDOL 100 ML: 755 INJECTION, SOLUTION INTRAVENOUS at 05:26

## 2025-05-12 RX ADMIN — ACETAMINOPHEN 650 MG: 325 TABLET ORAL at 15:28

## 2025-05-12 RX ADMIN — HYDROMORPHONE HYDROCHLORIDE 0.5 MG: 1 INJECTION, SOLUTION INTRAMUSCULAR; INTRAVENOUS; SUBCUTANEOUS at 09:32

## 2025-05-12 RX ADMIN — ENOXAPARIN SODIUM 40 MG: 100 INJECTION SUBCUTANEOUS at 09:44

## 2025-05-12 RX ADMIN — ALPRAZOLAM 0.25 MG: 0.5 TABLET ORAL at 22:26

## 2025-05-12 RX ADMIN — SODIUM CHLORIDE, SODIUM LACTATE, POTASSIUM CHLORIDE, AND CALCIUM CHLORIDE: .6; .31; .03; .02 INJECTION, SOLUTION INTRAVENOUS at 09:44

## 2025-05-12 RX ADMIN — HYDROMORPHONE HYDROCHLORIDE 0.5 MG: 1 INJECTION, SOLUTION INTRAMUSCULAR; INTRAVENOUS; SUBCUTANEOUS at 04:58

## 2025-05-12 RX ADMIN — SODIUM CHLORIDE, SODIUM LACTATE, POTASSIUM CHLORIDE, AND CALCIUM CHLORIDE: .6; .31; .03; .02 INJECTION, SOLUTION INTRAVENOUS at 19:14

## 2025-05-12 ASSESSMENT — PAIN SCALES - GENERAL
PAINLEVEL_OUTOF10: 4
PAINLEVEL_OUTOF10: 3
PAINLEVEL_OUTOF10: 7
PAINLEVEL_OUTOF10: 2
PAINLEVEL_OUTOF10: 7

## 2025-05-12 ASSESSMENT — PAIN - FUNCTIONAL ASSESSMENT
PAIN_FUNCTIONAL_ASSESSMENT: ACTIVITIES ARE NOT PREVENTED
PAIN_FUNCTIONAL_ASSESSMENT: ACTIVITIES ARE NOT PREVENTED
PAIN_FUNCTIONAL_ASSESSMENT: 0-10
PAIN_FUNCTIONAL_ASSESSMENT: ACTIVITIES ARE NOT PREVENTED
PAIN_FUNCTIONAL_ASSESSMENT: ACTIVITIES ARE NOT PREVENTED

## 2025-05-12 ASSESSMENT — PAIN DESCRIPTION - LOCATION
LOCATION: CHEST
LOCATION: HEAD

## 2025-05-12 ASSESSMENT — PAIN DESCRIPTION - PAIN TYPE
TYPE: ACUTE PAIN

## 2025-05-12 ASSESSMENT — PAIN DESCRIPTION - DESCRIPTORS
DESCRIPTORS: SHARP
DESCRIPTORS: ACHING

## 2025-05-12 ASSESSMENT — PAIN DESCRIPTION - ORIENTATION
ORIENTATION: MID
ORIENTATION: LOWER
ORIENTATION: MID
ORIENTATION: INNER
ORIENTATION: LOWER

## 2025-05-12 NOTE — ED NOTES
ED TO INPATIENT SBAR HANDOFF    Patient Name: Cynthia Benites   :  1955  70 y.o.   MRN:  [id number]  ED Room #:  ER12/12     Situation  Code Status: Full Code   Allergies: Aspirin, Penicillins, Codeine, Red dye #40 (allura red), Sulfa antibiotics, and Warfarin  Weight: Patient Vitals for the past 96 hrs (Last 3 readings):   Weight   25 0321 65.3 kg (144 lb)       Arrived from: home    Chief Complaint:   Chief Complaint   Patient presents with    Chest Pain       Hospital Problem/Diagnosis:  Principal Problem:    Acute pancreatitis, unspecified complication status, unspecified pancreatitis type  Resolved Problems:    * No resolved hospital problems. *      Mobility: no current mobility problem   ED Fall Risk: Presents to emergency department  because of falls (Syncope, seizure, or loss of consciousness): No, Age > 70: No, Altered Mental Status, Intoxication with alcohol or substance confusion (Disorientation, impaired judgment, poor safety awaremess, or inability to follow instructions): No, Impaired Mobility: Ambulates or transfers with assistive devices or assistance; Unable to ambulate or transer.: No, Nursing Judgement: No   Fell in ED or prior to admission: no   Restraints: no     Sitter: no   Family/Caregiver Present: no    Neet to know social/safety information: n/a    Background  History:   Past Medical History:   Diagnosis Date    DVT (deep venous thrombosis) (HCC)         Family history of breast cancer 2009    Hx of blood clots     Hypothyroid 2009    Pancreas divisum, native 2023    Mri     Pancreatitis 2023 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 No evidence of pulmonary embolism.    2. Peripancreatic fluid consistent with acute pancreatitis.

## 2025-05-12 NOTE — ACP (ADVANCE CARE PLANNING)
Advance Care Planning     Advance Care Planning (ACP) Physician/NP/PA Conversation    Date of Conversation: 5/12/2025  Conducted with: Patient with Decision Making Capacity  Other persons present: None    Healthcare Decision Maker:   Primary Decision Maker: Kyra Solorzano - [place] - [phone]         Care Preferences:    Hospitalization:  \"If your health worsens and it becomes clear that your chance of recovery is unlikely, what would be your preference regarding hospitalization?\"  The patient would prefer hospitalization.    Ventilation:  \"If you were unable to breath on your own and your chance of recovery was unlikely, what would be your preference about the use of a ventilator (breathing machine) if it was available to you?\"  The patient would desire the use of a ventilator.    Resuscitation:  \"In the event your heart stopped as a result of an underlying serious health condition, would you want attempts made to restart your heart, or would you prefer a natural death?\"  Yes, attempt to resuscitate.    ventilation preferences, hospitalization preferences, and resuscitation preferences    Conversation Outcomes / Follow-Up Plan:  ACP complete - no further action today  Reviewed DNR/DNI and patient elects Full Code (Attempt Resuscitation)    Length of Voluntary ACP Conversation in minutes:  16 minutes    YO Jose - NP

## 2025-05-12 NOTE — H&P
History and Physical    Date of Service:  5/12/2025  Primary Care Provider: Cristina Patel MD  Source of information: patient, electronic medical record    Chief Complaint: Chest Pain      History of Presenting Illness:   Cynthia Benites is a 70 y.o. female who presents with to the emergency department at our facility with complaints of epigastric and chest pain.  Patient awoke overnight with these complaints, similar to her previous episode of pancreatitis.  Patient does not smoke, does not habitually drink alcohol however did have 1 glass of wine 6 days ago.  Slightly elevated troponin, 53, follow-up was 57, EKG unremarkable however did have lipase greater than 5000.CT was consistent with acute pancreatitis.  Patient was given analgesics and referred to the hospitalist service for further management.    The patient denies any headache, blurry vision, sore throat, trouble swallowing, trouble with speech, chest pain, SOB, cough, fever, chills, N/V/D, abd pain, urinary symptoms, constipation, recent travels, sick contacts, focal or generalized neurological symptoms, falls, injuries, rashes, contact with COVID-19 diagnosed patients, hematemesis, melena, hemoptysis, hematuria, rashes, denies starting any new medications and denies any other concerns or problems besides as mentioned above.         REVIEW OF SYSTEMS:  A comprehensive review of systems was negative except for that written in the History of Present Illness.     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

## 2025-05-12 NOTE — ED PROVIDER NOTES
[facility] EMERGENCY DEPARTMENT  EMERGENCY DEPARTMENT ENCOUNTER      Pt Name: Cynthia Benites  MRN: [id number]  Birthdate 1955  Date of evaluation: 5/12/2025  Provider: Milli Vasquez MD    CHIEF COMPLAINT       Chief Complaint   Patient presents with    Chest Pain       ALLERGIES     Aspirin, Penicillins, Codeine, Red dye #40 (allura red), Sulfa antibiotics, and Warfarin    ENCOUNTER     HISTORY OF PRESENT ILLNESS:    A 70-year-old female with a past medical history significant for pancreatitis, fibromyalgia, restless leg syndrome, deep vein thrombosis (DVT), and a prior episode of transient global amnesia was brought in by EMS. The history was provided by both EMS and the patient. She presents with hypogastric and midsternal chest pain that woke her from sleep, which she describes as similar to past episodes of pancreatitis. The pain occurs in waves in the epigastrium and lower chest and radiates to her back. She also reports nausea without vomiting and shortness of breath but denies diaphoresis. She experienced swelling about a week ago that has since resolved. The patient drank alcohol on her birthday on the [birth year]th, although she typically avoids alcohol due to her history of pancreatitis. She has been experiencing significant anxiety since her 's death five months ago. EMS administered nitroglycerin prior to arrival.    PAST MEDICAL HISTORY:  - Pancreatitis  - Fibromyalgia  - Restless leg syndrome  - Deep vein thrombosis (DVT)  - Prior episode of transient global amnesia    PAST SURGICAL HISTORY:  - IVC filter placement 20 years ago    SOCIAL HISTORY:  - History of tobacco use  - Drank alcohol on birthday on 7th, typically avoids alcohol due to pancreatitis history    PHYSICAL EXAM:  Vitals: Hypertensive at 144/66, otherwise within normal limits.  General: NAD. Well-kept female adult.  Eyes: Appear normal with no scleral icterus.  HENT: Atraumatic. Moist mucous membranes, no pharyngeal

## 2025-05-12 NOTE — CONSULTS
[facility]  [address]  [place] [address] [phone]                     GI CONSULTATION NOTE      NAME:  Cynthia Benites   :   1955   MRN:   [id number]     Consult Date: 2025     Chief Complaint: pancreatitis    History of Present Illness:  Patient is a 70 y.o. WF who is seen in consultation at the request of Dr. Haddad for the above mentioned problem. She has a history of DVT, hypothyroidism, pancreas divisum. She presented to ED with acute onset abdominal pain after eating a pastrami sandwich. CTAP w/ IV CON showed acute pancreatitis with lipase >5000. She does not drink ETOH regularly and does not smoke. No new medications. VSS. No leukocytosis. She is on LR at 125, lovenox, and tylenol for pain.        PMH:  Past Medical History:   Diagnosis Date    DVT (deep venous thrombosis) (HCC)         Family history of breast cancer 2009    Hx of blood clots     Hypothyroid 2009    Pancreas divisum, native 2023    Mri     Pancreatitis 2023 Dr kenyon evaluated, congenital abn pancreas    Presence of IVC filter 2023    S/P colonoscopy 2009       PSH:  Past Surgical History:   Procedure Laterality Date    COLONOSCOPY  3/2013    COLONOSCOPY N/A 08/10/2023    COLONOSCOPY performed by Diana Kenyon MD at [facility] ENDOSCOPY    FOOT SURGERY Right 2024    HEENT      wisdom teeth extraction    KNEE ARTHROSCOPY Left 2019    KNEE ARTHROSCOPY Right 2024    RIGHT KNEE ARTHROSCOPIC PARTIAL LATERAL MENISCECTOMY, LATERAL FEMORAL CONDYLE CONDROPLASTY performed by Chanda Sanchez MD (Jody) at [facility] AMBULATORY OR    ORTHOPEDIC SURGERY  2016    right 3rd finger trigger release    TONSILLECTOMY      TUBAL LIGATION      VASCULAR SURGERY      insertion of Greenfielf filter       Allergies:  Allergies   Allergen Reactions    Aspirin Anaphylaxis    Penicillins Hives    Codeine Hives    Red Dye #40 (Allura Red) Swelling     Red dye # 5 -

## 2025-05-12 NOTE — ED NOTES
Bedside and Verbal shift change report given to SHARLA Olmstead (oncoming nurse) by SHARLA Estrada (offgoing nurse). Report included the following information Nurse Handoff Report, Index, ED Encounter Summary, ED SBAR, and Adult Overview.

## 2025-05-12 NOTE — ED TRIAGE NOTES
Patient BIBA from home with CC of midsternal chest pain that woke her up out of her sleep. Per EMS patient has been anxious. Given nitro PTA.       Hx of pancreatitis.

## 2025-05-13 ENCOUNTER — APPOINTMENT (OUTPATIENT)
Facility: HOSPITAL | Age: 70
DRG: 440 | End: 2025-05-13
Payer: MEDICARE

## 2025-05-13 LAB
ALBUMIN SERPL-MCNC: 3.1 G/DL (ref 3.5–5)
ALBUMIN/GLOB SERPL: 1.1 (ref 1.1–2.2)
ALP SERPL-CCNC: 108 U/L (ref 45–117)
ALT SERPL-CCNC: 24 U/L (ref 12–78)
ANION GAP SERPL CALC-SCNC: 5 MMOL/L (ref 2–12)
AST SERPL-CCNC: 26 U/L (ref 15–37)
BILIRUB SERPL-MCNC: 0.9 MG/DL (ref 0.2–1)
BUN SERPL-MCNC: 8 MG/DL (ref 6–20)
BUN/CREAT SERPL: 16 (ref 12–20)
CALCIUM SERPL-MCNC: 9.3 MG/DL (ref 8.5–10.1)
CHLORIDE SERPL-SCNC: 109 MMOL/L (ref 97–108)
CO2 SERPL-SCNC: 25 MMOL/L (ref 21–32)
CREAT SERPL-MCNC: 0.5 MG/DL (ref 0.55–1.02)
GLOBULIN SER CALC-MCNC: 2.9 G/DL (ref 2–4)
GLUCOSE SERPL-MCNC: 94 MG/DL (ref 65–100)
LIPASE SERPL-CCNC: 357 U/L (ref 13–75)
POTASSIUM SERPL-SCNC: 3.9 MMOL/L (ref 3.5–5.1)
PROT SERPL-MCNC: 6 G/DL (ref 6.4–8.2)
SODIUM SERPL-SCNC: 139 MMOL/L (ref 136–145)

## 2025-05-13 PROCEDURE — 76705 ECHO EXAM OF ABDOMEN: CPT

## 2025-05-13 PROCEDURE — 2500000003 HC RX 250 WO HCPCS: Performed by: NURSE PRACTITIONER

## 2025-05-13 PROCEDURE — 6370000000 HC RX 637 (ALT 250 FOR IP): Performed by: NURSE PRACTITIONER

## 2025-05-13 PROCEDURE — 2580000003 HC RX 258: Performed by: NURSE PRACTITIONER

## 2025-05-13 PROCEDURE — 6360000002 HC RX W HCPCS: Performed by: NURSE PRACTITIONER

## 2025-05-13 PROCEDURE — 80053 COMPREHEN METABOLIC PANEL: CPT

## 2025-05-13 PROCEDURE — 1200000000 HC SEMI PRIVATE

## 2025-05-13 PROCEDURE — 83690 ASSAY OF LIPASE: CPT

## 2025-05-13 RX ADMIN — SODIUM CHLORIDE, PRESERVATIVE FREE 10 ML: 5 INJECTION INTRAVENOUS at 20:22

## 2025-05-13 RX ADMIN — SODIUM CHLORIDE, SODIUM LACTATE, POTASSIUM CHLORIDE, AND CALCIUM CHLORIDE: .6; .31; .03; .02 INJECTION, SOLUTION INTRAVENOUS at 03:12

## 2025-05-13 RX ADMIN — HYDROMORPHONE HYDROCHLORIDE 0.5 MG: 1 INJECTION, SOLUTION INTRAMUSCULAR; INTRAVENOUS; SUBCUTANEOUS at 02:07

## 2025-05-13 RX ADMIN — ALPRAZOLAM 0.25 MG: 0.5 TABLET ORAL at 20:21

## 2025-05-13 RX ADMIN — SODIUM CHLORIDE, PRESERVATIVE FREE 10 ML: 5 INJECTION INTRAVENOUS at 09:31

## 2025-05-13 RX ADMIN — ENOXAPARIN SODIUM 40 MG: 100 INJECTION SUBCUTANEOUS at 09:30

## 2025-05-13 RX ADMIN — LEVOTHYROXINE SODIUM 88 MCG: 0.09 TABLET ORAL at 09:30

## 2025-05-13 ASSESSMENT — PAIN SCALES - GENERAL: PAINLEVEL_OUTOF10: 4

## 2025-05-13 ASSESSMENT — PAIN DESCRIPTION - ORIENTATION: ORIENTATION: MID

## 2025-05-13 ASSESSMENT — PAIN DESCRIPTION - LOCATION: LOCATION: ABDOMEN

## 2025-05-13 ASSESSMENT — PAIN DESCRIPTION - DESCRIPTORS: DESCRIPTORS: ACHING

## 2025-05-13 NOTE — ACP (ADVANCE CARE PLANNING)
Advance Care Planning     Advance Care Planning Inpatient Note  Spiritual Care Department    Today's Date: 5/12/2025  Unit: [address] ONCOLOGY    Received request from IDT Member.  Upon review of chart and communication with care team, patient's decision making abilities are not in question.. Patient and Child/Children was/were present in the room during visit.    Goals of ACP Conversation:  Discuss advance care planning documents  Facilitate a discussion related to patient's goals of care as they align with the patient's values and beliefs.    Health Care Decision Makers:     Awaiting documentation from patient's . Patient may wish to complete new AMD.     Summary:  No Decision Maker named by patient at this time    Advance Care Planning Documents (Patient Wishes):  None     Assessment:  Paged to have ACP conversation with patient Cynthia. When I arrived, patient was alone, later joined by her daughter and son.     I facilitated a conversation about ACP. Cynthia believes she has something stipulating medical power of  and she is going to try and see if she can produce that document for the hospital's records.   Cynthia has three children and four grandchildren that she adores. She is currently still grieving the loss of her , Star. They were  for over 40 years.     Patient expressed interest in completing the form [facility] before her , Star, passed away last year. I left patient with a copy of the AMD and she is aware that she can request spiritual care if and when she is ready to continue and complete the form. Patient and family are aware that spiritual care is available to them for any needs upon request.     Interventions:  Requested patient/family to submit existing document for our records: Healthcare Power of /Advance Directive Appointment of Health Care Agent  Living Will/Advance Directive  Provided education on documents for clarity and

## 2025-05-14 VITALS
OXYGEN SATURATION: 98 % | BODY MASS INDEX: 21.23 KG/M2 | RESPIRATION RATE: 16 BRPM | HEIGHT: 68 IN | DIASTOLIC BLOOD PRESSURE: 77 MMHG | WEIGHT: 140.1 LBS | TEMPERATURE: 97.9 F | SYSTOLIC BLOOD PRESSURE: 118 MMHG | HEART RATE: 80 BPM

## 2025-05-14 PROCEDURE — 2500000003 HC RX 250 WO HCPCS: Performed by: NURSE PRACTITIONER

## 2025-05-14 PROCEDURE — 6370000000 HC RX 637 (ALT 250 FOR IP): Performed by: NURSE PRACTITIONER

## 2025-05-14 RX ADMIN — SODIUM CHLORIDE, PRESERVATIVE FREE 10 ML: 5 INJECTION INTRAVENOUS at 08:53

## 2025-05-14 RX ADMIN — LEVOTHYROXINE SODIUM 88 MCG: 0.09 TABLET ORAL at 08:52

## 2025-05-14 NOTE — PLAN OF CARE
Problem: Pain  Goal: Verbalizes/displays adequate comfort level or baseline comfort level  5/14/2025 0929 by Zee Austin RN  Outcome: Progressing  5/13/2025 2255 by Erin Gonsalez LPN  Outcome: Progressing     Problem: Safety - Adult  Goal: Free from fall injury  5/14/2025 0929 by Zee Austin RN  Outcome: Progressing  5/13/2025 2255 by Erin Gonsalez LPN  Outcome: Progressing     Problem: ABCDS Injury Assessment  Goal: Absence of physical injury  5/14/2025 0929 by Zee Austin RN  Outcome: Progressing  5/13/2025 2255 by Erin Gonsalez LPN  Outcome: Progressing

## 2025-05-14 NOTE — PROGRESS NOTES
[facility]                                                                                          Hospitalist Progress Note  YO Arevalo - CNP  Office Phone: [phone]        Date of Service:  2025  NAME:  Cynthia Benites  :  1955  MRN:  [id number]       Admission Summary:   Cynthia Benites is a 70 y.o. female who presents with to the emergency department at our facility with complaints of epigastric and chest pain.  Patient awoke overnight with these complaints, similar to her previous episode of pancreatitis.  Patient does not smoke, does not habitually drink alcohol however did have 1 glass of wine 6 days ago.  Slightly elevated troponin, 53, follow-up was 57, EKG unremarkable however did have lipase greater than 5000.CT was consistent with acute pancreatitis.  Patient was given analgesics and referred to the hospitalist service for further managemen        Interval history / Subjective:   Patient seen resting in bed in NAD. States she feels much better today than yesterday.  Pain and nausea are controlled.  Anxious to go home.      Assessment & Plan:     Acute pancreatitis  - seen on CT, lipase greater than 5000: improved today to 357  - Continue PRN analgesics and antiemetics  - advance diet , if tolerates diet can be Dc'ed tomorrow   - LFTs within normal limits  - does have hx of pancreas divisum  - appreciate GI input: mild uncomplicated pancreatitis, can arrange for outpatient EUS or referral for consideration of cholecystectomy      Hypothyroid  - Continuing levothyroxine     History DVT  - Does have IVC filter in place  - No longer on anticoagulant        Code status: Full   Prophylaxis: Lovenox   Care Plan discussed with: patient, nursing, attending   Anticipated Disposition: likely home tomorrow pending improvement, outpatient follow up for GI and consideration of elective cholecystectomy   Inpatient  Cardiac monitoring: None  Central 
[facility]  [address]  [place] [address] [phone]                     GI PROGRESS NOTE    Patient Name: Cynthia Benites      : 1955      MRN: [id number]  Admit Date: 2025  Today's Date: 2025  CC: follow up    Subjective:     Tolerated regular diet. Eager to go home.    Objective:     Blood pressure 118/77, pulse 80, temperature 97.9 °F (36.6 °C), temperature source Oral, resp. rate 16, height 1.727 m (5' 8\"), weight 63.5 kg (140 lb 1.6 oz), SpO2 98%.    Physical Exam:  General appearance: cooperative, no distress, appears stated age  Skin: Extremities and face reveal no rashes. No cui erythema.   HEENT: Sclerae anicteric. Extra-occular muscles are intact.   Cardiovascular: Regular rate and rhythm. No murmurs, gallops, or rubs.   Respiratory: Comfortable breathing with no accessory muscle use. Clear breath sounds with no wheezes, rales, or rhonchi.   GI: Abdomen nondistended, soft, and nontender. Normal active bowel sounds. No enlargement of the liver or spleen. No masses palpable.   Rectal: Deferred   Musculoskeletal: No pitting edema of the lower legs.  No costovertebral tenderness.   Neurological: Gross memory appears intact. Patient is alert and oriented.   Psychiatric: Mood appears appropriate with good judgement.  No anxiety or agitation.    Data Review:    No results found for this or any previous visit (from the past 24 hours).        Assessment / Plan :     71 yo WF w/ pmh pancreas divisum referred to GI for acute pancreatitis.  She is clinically improved. U/S without gallstones. Tolerating diet. We will arrange for outpatient evaluation with EUS. GI will sign off.       Patient Active Hospital Problem List:   Principal Problem:    Acute pancreatitis, unspecified complication status, unspecified pancreatitis type  Resolved Problems:    * No resolved hospital problems. *      Time Spent with Patient: 5 MINUTES    Krish Dugan 
Provided pastoral care visit to [facility] patient.  Did not include sacramental care.  
Pt is a&ox4. Vs are stable. Discharge paperwork went over with and given to pt. Pt has no questions.  
Spiritual Health History and Assessment/Progress Note  [facility]    Rituals, Rites and Sacraments,  ,  ,      Name: Cynthia Benites MRN: [id number]    Age: 70 y.o.     Sex: female   Language: English   Gnosticism: Anabaptist   Acute pancreatitis, unspecified complication status, unspecified pancreatitis type     Date: 5/14/2025            Total Time Calculated: 5 min              Spiritual Assessment continued in [address] ONCOLOGY        Referral/Consult From: Clergy/   Encounter Overview/Reason: Rituals, Rites and Sacraments  Service Provided For: Patient    Jaz, Belief, Meaning:   Patient is connected with a jza tradition or spiritual practice  Family/Friends No family/friends present      Importance and Influence:  Patient has spiritual/personal beliefs that influence decisions regarding their health  Family/Friends No family/friends present    Community:  Patient is connected with a spiritual community  Family/Friends No family/friends present    Assessment and Plan of Care:     Patient Interventions include: Provided sacramental/Uatsdin ritual  Family/Friends Interventions include: No family/friends present    Patient Plan of Care: Spiritual Care available upon further referral  Family/Friends Plan of Care: Spiritual Care available upon further referral    Electronically signed by Chaplain DIANNA on 5/14/2025 at 12:46 PM     [facility] visit.  Mrs. Benites is Anabaptist. She was sitting in a chair in the [place].  She declined communion today and is hoping to go home.  Assured of prayer for her well-being.     Sr. CHRIS Gates, RN, ACSW, LCSW   Page:  [phone]  
Spiritual Health History and Assessment/Progress Note  [facility]    Advance Care Planning,  ,  ,      Name: Cynthia Benites MRN: [id number]    Age: 70 y.o.     Sex: female   Language: English   Roman Catholic: Yazdanism   Acute pancreatitis, unspecified complication status, unspecified pancreatitis type     Date: 5/12/2025            Total Time Calculated: 57 min              Spiritual Assessment began in [facility] MED ONCOLOGY        Referral/Consult From: Nurse   Encounter Overview/Reason: Advance Care Planning  Service Provided For: Patient and family together (Son and daughter)    Jaz, Belief, Meaning:   Patient identifies as spiritual, is connected with a jaz tradition or spiritual practice, and has beliefs or practices that help with coping during difficult times  Family/Friends have beliefs or practices that help with coping during difficult times      Importance and Influence:  Patient has spiritual/personal beliefs that influence decisions regarding their health  Family/Friends have spiritual/personal beliefs that influence decisions regarding the patient's health    Community:  Patient feels well-supported. Support system includes: Children, Friends, and Extended family  Family/Friends feel well-supported. Support system includes: Extended family    Assessment and Plan of Care:     Patient Interventions include: Facilitated expression of thoughts and feelings, Explored spiritual coping/struggle/distress, Engaged in theological reflection, Affirmed coping skills/support systems, Facilitated life review and/ or legacy, and Assisted in Advance Care Planning conversation  Family/Friends Interventions include: Affirmed coping skills/support systems and Assisted in Advance Care Planning conversation    Patient Plan of Care: Spiritual Care available upon further referral  Family/Friends Plan of Care: Spiritual Care available upon further referral    Paged to have ACP conversation with patient Cynthia. When I 
78 U/L    AST 26 15 - 37 U/L    Alk Phosphatase 108 45 - 117 U/L    Total Protein 6.0 (L) 6.4 - 8.2 g/dL    Albumin 3.1 (L) 3.5 - 5.0 g/dL    Globulin 2.9 2.0 - 4.0 g/dL    Albumin/Globulin Ratio 1.1 1.1 - 2.2     Lipase    Collection Time: 05/13/25  4:15 AM   Result Value Ref Range    Lipase 357 (H) 13 - 75 U/L         Assessment / Plan :     69 yo WF w/ pmh pancreas divisum referred to GI for acute pancreatitis.     Lipase improved. Tolerated CLD. U/S without gallstones.     - advance diet  - if she tolerates diet she is ok for D/C from GI standpoint  - will arrange for outpatient follow up in clinic to discuss role of EUS     Patient Active Hospital Problem List:   Principal Problem:    Acute pancreatitis, unspecified complication status, unspecified pancreatitis type  Resolved Problems:    * No resolved hospital problems. *      Time Spent with Patient: 15 MINUTES    Krish Dugan PA-C  05/13/25  10:18 AM

## 2025-05-14 NOTE — DISCHARGE SUMMARY
Discharge Summary       PATIENT ID: Cynthia Benites  MRN: [id number]   YOB: 1955    DATE OF ADMISSION: 5/12/2025  3:11 AM    DATE OF DISCHARGE: 5/14/2025  PRIMARY CARE PROVIDER: Cristina Patel MD     ATTENDING PHYSICIAN: Dr. Ge  DISCHARGING PROVIDER: YO Diaz NP    To contact this individual call [phone] and ask the  to page.  If unavailable ask to be transferred the Adult Hospitalist Department.    CONSULTATIONS: IP CONSULT TO GI    PROCEDURES/SURGERIES: * No surgery found *    ADMITTING DIAGNOSES & HOSPITAL COURSE:   Cynthia Benites is a 70 y.o. female who presents with to the emergency department at our facility with complaints of epigastric and chest pain.  Patient awoke overnight with these complaints, similar to her previous episode of pancreatitis.  Patient does not smoke, does not habitually drink alcohol however did have 1 glass of wine 6 days ago.  Slightly elevated troponin, 53, follow-up was 57, EKG unremarkable however did have lipase greater than 5000.CT was consistent with acute pancreatitis.  Patient was given analgesics and referred to the hospitalist service for further managemen     5/14/2025  VSS  Per GI: Reviewed ultrasound without significant findings.  Okay with discharge home given toleration of diet and clinical resolution of mild, uncomplicated acute pancreatitis.  Recommend outpatient follow-up to discuss endoscopic ultrasound and potentially thereafter role for cholecystectomy and ERCP.    Triglycerides 51  LFTs WNL  No alcohol  IgG previously tested and normal    Patient has tolerated diet without pain and anxious to go home    DISCHARGE DIAGNOSES / PLAN:      Acute pancreatitis - improved  - seen on CT, lipase greater than 5000: improved today to 357  - Continue PRN analgesics and antiemetics  - advance diet , if tolerates diet can be Dc'ed tomorrow   - LFTs within normal limits  - does have hx of pancreas divisum  - appreciate GI input:

## 2025-05-15 ENCOUNTER — TELEPHONE (OUTPATIENT)
Facility: CLINIC | Age: 70
End: 2025-05-15

## 2025-05-15 NOTE — TELEPHONE ENCOUNTER
Patient cannot combine a hospital follow up appt & a wellness visit in the same appt. Please notify the provider requests 30 mins for a hospital follow up appt. & the slot she is in will not allow enough time. Okay to move her appt to the 8:30 slot. Please notify her of her appt time change.

## 2025-05-15 NOTE — TELEPHONE ENCOUNTER
Patient called and stated that the hospital follow up appointment was to far out so she will see the provider at her original appointment schedule for 05/29/2025

## 2025-05-16 LAB
EKG ATRIAL RATE: 73 BPM
EKG DIAGNOSIS: NORMAL
EKG P AXIS: 67 DEGREES
EKG P-R INTERVAL: 188 MS
EKG Q-T INTERVAL: 386 MS
EKG QRS DURATION: 80 MS
EKG QTC CALCULATION (BAZETT): 425 MS
EKG R AXIS: 76 DEGREES
EKG T AXIS: 77 DEGREES
EKG VENTRICULAR RATE: 73 BPM

## 2025-05-19 ENCOUNTER — TELEPHONE (OUTPATIENT)
Facility: CLINIC | Age: 70
End: 2025-05-19

## 2025-05-19 NOTE — TELEPHONE ENCOUNTER
Unable to locate a colonoscopy from 2017 but do see 1 from 2023. Did fax the 2023 report. Patient will need to contact her gi provider to obtain the 2017 report.

## 2025-05-19 NOTE — TELEPHONE ENCOUNTER
Natali with [facility] called stating the patient had a colonoscopy in 2017 and they would like to obtain the pathology report - fax to [phone]

## 2025-05-26 SDOH — ECONOMIC STABILITY: INCOME INSECURITY: IN THE LAST 12 MONTHS, WAS THERE A TIME WHEN YOU WERE NOT ABLE TO PAY THE MORTGAGE OR RENT ON TIME?: NO

## 2025-05-26 SDOH — ECONOMIC STABILITY: FOOD INSECURITY: WITHIN THE PAST 12 MONTHS, YOU WORRIED THAT YOUR FOOD WOULD RUN OUT BEFORE YOU GOT MONEY TO BUY MORE.: NEVER TRUE

## 2025-05-26 SDOH — ECONOMIC STABILITY: FOOD INSECURITY: WITHIN THE PAST 12 MONTHS, THE FOOD YOU BOUGHT JUST DIDN'T LAST AND YOU DIDN'T HAVE MONEY TO GET MORE.: NEVER TRUE

## 2025-05-26 SDOH — ECONOMIC STABILITY: TRANSPORTATION INSECURITY
IN THE PAST 12 MONTHS, HAS THE LACK OF TRANSPORTATION KEPT YOU FROM MEDICAL APPOINTMENTS OR FROM GETTING MEDICATIONS?: NO

## 2025-05-29 ENCOUNTER — OFFICE VISIT (OUTPATIENT)
Facility: CLINIC | Age: 70
End: 2025-05-29

## 2025-05-29 VITALS
SYSTOLIC BLOOD PRESSURE: 108 MMHG | TEMPERATURE: 97.7 F | HEART RATE: 66 BPM | DIASTOLIC BLOOD PRESSURE: 76 MMHG | HEIGHT: 68 IN | WEIGHT: 140 LBS | OXYGEN SATURATION: 98 % | BODY MASS INDEX: 21.22 KG/M2 | RESPIRATION RATE: 16 BRPM

## 2025-05-29 DIAGNOSIS — E03.9 ACQUIRED HYPOTHYROIDISM: ICD-10-CM

## 2025-05-29 DIAGNOSIS — R73.01 IFG (IMPAIRED FASTING GLUCOSE): ICD-10-CM

## 2025-05-29 DIAGNOSIS — K85.00 IDIOPATHIC ACUTE PANCREATITIS, UNSPECIFIED COMPLICATION STATUS: Primary | ICD-10-CM

## 2025-05-29 DIAGNOSIS — Q45.3 PANCREAS DIVISUM, NATIVE: ICD-10-CM

## 2025-05-29 DIAGNOSIS — K85.00 IDIOPATHIC ACUTE PANCREATITIS, UNSPECIFIED COMPLICATION STATUS: ICD-10-CM

## 2025-05-29 PROBLEM — K86.89 ACUTE PANCREATIC FLUID COLLECTION: Status: RESOLVED | Noted: 2024-04-03 | Resolved: 2025-05-29

## 2025-05-29 RX ORDER — ALPRAZOLAM 0.5 MG
TABLET ORAL
COMMUNITY
Start: 2025-05-15

## 2025-05-29 ASSESSMENT — PATIENT HEALTH QUESTIONNAIRE - PHQ9
1. LITTLE INTEREST OR PLEASURE IN DOING THINGS: NOT AT ALL
SUM OF ALL RESPONSES TO PHQ QUESTIONS 1-9: 0
SUM OF ALL RESPONSES TO PHQ QUESTIONS 1-9: 0
2. FEELING DOWN, DEPRESSED OR HOPELESS: NOT AT ALL
SUM OF ALL RESPONSES TO PHQ QUESTIONS 1-9: 0
SUM OF ALL RESPONSES TO PHQ QUESTIONS 1-9: 0

## 2025-05-29 NOTE — PROGRESS NOTES
Cynthia Benites (:  1955) is a 70 y.o. female,Established patient, here for evaluation of the following chief complaint(s):  Follow-Up from Hospital ([facility]; -; pancreatitis )         Assessment & Plan  Idiopathic acute pancreatitis, unspecified complication status  Second episode of acute pancreatitis.  Etiology unclear.  Does have pancreas divisum.  Discussed hydration.  Regular meals.  Avoidance of alcohol.  Has follow-up with [facility] in .  Discussed lipase 300 on discharge.  Expected should be below this now.    Orders:  •  Lipase; Future  •  CBC; Future  •  Comprehensive Metabolic Panel; Future    Pancreas divisum, native            Acquired hypothyroidism  Currently on 88 mcg dose check levels    Orders:  •  TSH; Future    IFG (impaired fasting glucose)  Discussed diet.    Orders:  •  Hemoglobin A1C; Future      No follow-ups on file.       Subjective   HPI  Transition of care visit she was hospitalized at [facility] from May 12 through May 14 with acute pancreatitis.  On admission lipase level was 5000 and on discharge it was in the 300s.  CAT scan showed pancreatitis but no evidence of abscess.  She has previously been diagnosed with a pancreas divisum which may be the underlying etiology.  She rarely drinks alcohol.  Has not had symptoms suggestive of active gallbladder disease.  Notes that fatty foods do not cause nausea or vomiting or discomfort.  Does not take supplements other than vitamin D.  Feels that stress may be contributing and is working on stress reduction.  Has endoscopic ultrasound planned with GI in .  Uses Xanax rarely.  Encouraged to continue to minimize use.    Had a call with care manager on May 15.  Since discharge reports no nausea or vomiting no abdominal pain.  No constipation.  No fevers or chills.  Trying to eat more regular food.  No bloody stools.  No further leg swelling.  Does have an IVC filter.  Review of Systems       Objective   Physical

## 2025-05-29 NOTE — ASSESSMENT & PLAN NOTE
Second episode of acute pancreatitis.  Etiology unclear.  Does have pancreas divisum.  Discussed hydration.  Regular meals.  Avoidance of alcohol.  Has follow-up with [facility] in June.  Discussed lipase 300 on discharge.  Expected should be below this now.    Orders:    Lipase; Future    CBC; Future    Comprehensive Metabolic Panel; Future

## 2025-05-30 LAB
ALBUMIN SERPL-MCNC: 3.9 G/DL (ref 3.5–5)
ALBUMIN/GLOB SERPL: 1.2 (ref 1.1–2.2)
ALP SERPL-CCNC: 130 U/L (ref 45–117)
ALT SERPL-CCNC: 24 U/L (ref 12–78)
ANION GAP SERPL CALC-SCNC: 2 MMOL/L (ref 2–12)
AST SERPL-CCNC: 24 U/L (ref 15–37)
BILIRUB SERPL-MCNC: 0.7 MG/DL (ref 0.2–1)
BUN SERPL-MCNC: 15 MG/DL (ref 6–20)
BUN/CREAT SERPL: 22 (ref 12–20)
CALCIUM SERPL-MCNC: 10 MG/DL (ref 8.5–10.1)
CHLORIDE SERPL-SCNC: 106 MMOL/L (ref 97–108)
CO2 SERPL-SCNC: 30 MMOL/L (ref 21–32)
CREAT SERPL-MCNC: 0.69 MG/DL (ref 0.55–1.02)
ERYTHROCYTE [DISTWIDTH] IN BLOOD BY AUTOMATED COUNT: 12.1 % (ref 11.5–14.5)
EST. AVERAGE GLUCOSE BLD GHB EST-MCNC: 105 MG/DL
GLOBULIN SER CALC-MCNC: 3.3 G/DL (ref 2–4)
GLUCOSE SERPL-MCNC: 102 MG/DL (ref 65–100)
HBA1C MFR BLD: 5.3 % (ref 4–5.6)
HCT VFR BLD AUTO: 42.9 % (ref 35–47)
HGB BLD-MCNC: 13.5 G/DL (ref 11.5–16)
LIPASE SERPL-CCNC: 41 U/L (ref 13–75)
MCH RBC QN AUTO: 30.5 PG (ref 26–34)
MCHC RBC AUTO-ENTMCNC: 31.5 G/DL (ref 30–36.5)
MCV RBC AUTO: 96.8 FL (ref 80–99)
NRBC # BLD: 0 K/UL (ref 0–0.01)
NRBC BLD-RTO: 0 PER 100 WBC
PLATELET # BLD AUTO: 254 K/UL (ref 150–400)
PMV BLD AUTO: 11.6 FL (ref 8.9–12.9)
POTASSIUM SERPL-SCNC: 4.6 MMOL/L (ref 3.5–5.1)
PROT SERPL-MCNC: 7.2 G/DL (ref 6.4–8.2)
RBC # BLD AUTO: 4.43 M/UL (ref 3.8–5.2)
SODIUM SERPL-SCNC: 138 MMOL/L (ref 136–145)
TSH SERPL DL<=0.05 MIU/L-ACNC: 1.81 UIU/ML (ref 0.36–3.74)
WBC # BLD AUTO: 5.4 K/UL (ref 3.6–11)

## 2025-06-01 ENCOUNTER — RESULTS FOLLOW-UP (OUTPATIENT)
Facility: CLINIC | Age: 70
End: 2025-06-01

## 2025-07-17 DIAGNOSIS — F41.9 ANXIETY: Primary | ICD-10-CM

## 2025-07-17 RX ORDER — ALPRAZOLAM 0.5 MG
TABLET ORAL
Qty: 30 TABLET | Refills: 1 | Status: SHIPPED | OUTPATIENT
Start: 2025-07-17 | End: 2025-08-16

## 2025-07-17 NOTE — TELEPHONE ENCOUNTER
Last visit 5/29/25  Follow up 9/30/25    Lab Results   Component Value Date     05/29/2025    K 4.6 05/29/2025     05/29/2025    CO2 30 05/29/2025    BUN 15 05/29/2025    CREATININE 0.69 05/29/2025    GLUCOSE 102 (H) 05/29/2025    CALCIUM 10.0 05/29/2025    BILITOT 0.7 05/29/2025    ALKPHOS 130 (H) 05/29/2025    AST 24 05/29/2025    ALT 24 05/29/2025    LABGLOM >90 05/29/2025    GFRAA >60 02/15/2022    AGRATIO 0.9 (L) 01/18/2023    GLOB 3.3 05/29/2025     Lab Results   Component Value Date    WBC 5.4 05/29/2025    HGB 13.5 05/29/2025    HCT 42.9 05/29/2025    MCV 96.8 05/29/2025     05/29/2025

## 2025-07-22 ENCOUNTER — OFFICE VISIT (OUTPATIENT)
Age: 70
End: 2025-07-22

## 2025-07-22 VITALS
BODY MASS INDEX: 21.9 KG/M2 | HEART RATE: 74 BPM | RESPIRATION RATE: 17 BRPM | OXYGEN SATURATION: 98 % | SYSTOLIC BLOOD PRESSURE: 113 MMHG | WEIGHT: 144 LBS | DIASTOLIC BLOOD PRESSURE: 75 MMHG | TEMPERATURE: 98.4 F

## 2025-07-22 DIAGNOSIS — H60.503 ACUTE OTITIS EXTERNA OF BOTH EARS, UNSPECIFIED TYPE: Primary | ICD-10-CM

## 2025-07-22 DIAGNOSIS — H61.23 BILATERAL IMPACTED CERUMEN: ICD-10-CM

## 2025-07-22 RX ORDER — HYDROCORTISONE AND ACETIC ACID 20.75; 10.375 MG/ML; MG/ML
3 SOLUTION AURICULAR (OTIC) 3 TIMES DAILY
Qty: 10 ML | Refills: 0 | Status: SHIPPED | OUTPATIENT
Start: 2025-07-22 | End: 2025-07-29

## 2025-07-22 NOTE — PATIENT INSTRUCTIONS
Patient was seen today for evaluation of bilateral ear fullness and tinnitus  On exam she has a bilateral cerumen impaction  Ears are irrigated and earwax is successfully removed  On reevaluation, ear canals are mildly erythematous and swollen, will treat as a mild acute otitis externa

## 2025-07-22 NOTE — PROGRESS NOTES
Cynthia Benites (:  1955) is a 70 y.o. female,Established patient, here for evaluation of the following chief complaint(s):  Ear Fullness (Presents today for ears feeling clogged and fullness x4 days )      Assessment & Plan :  Visit Diagnoses and Associated Orders         Acute otitis externa of both ears, unspecified type    -  Primary    acetic acid-hydrocortisone (VOSOL-HC) 1-2 % otic solution [70268]             Bilateral impacted cerumen        REMOVE IMPACTED EAR WAX [84686 CPT(R)]                   Patient was seen today for evaluation of bilateral ear fullness and tinnitus  On exam she has a bilateral cerumen impaction  Ears are irrigated and earwax is successfully removed  On reevaluation, ear canals are mildly erythematous and swollen, will treat as a mild acute otitis externa  VoSol HC, 3 drops in each ear 3 times daily for 7 days  Please return if symptoms persist or worsen       Subjective :    Ear Fullness          70 y.o. female presents with symptoms of bilateral ear fullness which is ongoing for the past 4 days.  She denies any recent trauma or injury to the ears.  She denies any severe or significant pain.  She reports some tinnitus and fullness in both ears.  She states the left ear feels like a constant buzzing while the right ear feels like more of a ringing.  She denies any discharge or drainage from the ears.  She does report some recent swimming and notes that she does dive underwater regularly during the summer.  She denies any stuffy or runny nose, fevers, chills, body aches or fatigue.  Denies sore throat or cough.  Has not been using any eardrops or earwax softening agents at home.  She has had cerumen impaction in the past which is needed to be cleared         Vitals:    25 1639   BP: 113/75   BP Site: Right Upper Arm   Patient Position: Sitting   BP Cuff Size: Small Adult   Pulse: 74   Resp: 17   Temp: 98.4 °F (36.9 °C)   TempSrc: Oral   SpO2: 98%   Weight: 65.3 kg (144

## 2025-09-03 ENCOUNTER — APPOINTMENT (OUTPATIENT)
Facility: HOSPITAL | Age: 70
End: 2025-09-03
Attending: INTERNAL MEDICINE
Payer: MEDICARE

## 2025-09-03 ENCOUNTER — ANESTHESIA EVENT (OUTPATIENT)
Facility: HOSPITAL | Age: 70
End: 2025-09-03
Payer: MEDICARE

## 2025-09-03 ENCOUNTER — ANESTHESIA (OUTPATIENT)
Facility: HOSPITAL | Age: 70
End: 2025-09-03
Payer: MEDICARE

## 2025-09-03 ENCOUNTER — HOSPITAL ENCOUNTER (OUTPATIENT)
Facility: HOSPITAL | Age: 70
Setting detail: OUTPATIENT SURGERY
Discharge: HOME | End: 2025-09-03
Attending: INTERNAL MEDICINE | Admitting: INTERNAL MEDICINE
Payer: MEDICARE

## 2025-09-03 VITALS
OXYGEN SATURATION: 100 % | HEART RATE: 73 BPM | BODY MASS INDEX: 21.64 KG/M2 | RESPIRATION RATE: 18 BRPM | WEIGHT: 142.8 LBS | HEIGHT: 68 IN | SYSTOLIC BLOOD PRESSURE: 141 MMHG | DIASTOLIC BLOOD PRESSURE: 77 MMHG | TEMPERATURE: 98.6 F

## 2025-09-03 PROCEDURE — 7100000011 HC PHASE II RECOVERY - ADDTL 15 MIN: Performed by: INTERNAL MEDICINE

## 2025-09-03 PROCEDURE — 2580000003 HC RX 258: Performed by: INTERNAL MEDICINE

## 2025-09-03 PROCEDURE — 7100000010 HC PHASE II RECOVERY - FIRST 15 MIN: Performed by: INTERNAL MEDICINE

## 2025-09-03 PROCEDURE — 3700000000 HC ANESTHESIA ATTENDED CARE: Performed by: INTERNAL MEDICINE

## 2025-09-03 PROCEDURE — 3700000001 HC ADD 15 MINUTES (ANESTHESIA): Performed by: INTERNAL MEDICINE

## 2025-09-03 PROCEDURE — 3600007502: Performed by: INTERNAL MEDICINE

## 2025-09-03 PROCEDURE — 3600007512: Performed by: INTERNAL MEDICINE

## 2025-09-03 RX ORDER — LIDOCAINE HYDROCHLORIDE 20 MG/ML
INJECTION, SOLUTION EPIDURAL; INFILTRATION; INTRACAUDAL; PERINEURAL
Status: DISCONTINUED | OUTPATIENT
Start: 2025-09-03 | End: 2025-09-03 | Stop reason: SDUPTHER

## 2025-09-03 RX ORDER — SODIUM CHLORIDE 0.9 % (FLUSH) 0.9 %
5-40 SYRINGE (ML) INJECTION EVERY 12 HOURS SCHEDULED
Status: DISCONTINUED | OUTPATIENT
Start: 2025-09-03 | End: 2025-09-03 | Stop reason: HOSPADM

## 2025-09-03 RX ORDER — ALPRAZOLAM 0.5 MG
0.5 TABLET ORAL
COMMUNITY
Start: 2025-09-02

## 2025-09-03 RX ORDER — SODIUM CHLORIDE 9 MG/ML
INJECTION, SOLUTION INTRAVENOUS PRN
Status: DISCONTINUED | OUTPATIENT
Start: 2025-09-03 | End: 2025-09-03 | Stop reason: HOSPADM

## 2025-09-03 RX ORDER — SODIUM CHLORIDE 9 MG/ML
INJECTION, SOLUTION INTRAVENOUS CONTINUOUS
Status: DISCONTINUED | OUTPATIENT
Start: 2025-09-03 | End: 2025-09-03 | Stop reason: HOSPADM

## 2025-09-03 RX ORDER — SODIUM CHLORIDE 0.9 % (FLUSH) 0.9 %
5-40 SYRINGE (ML) INJECTION PRN
Status: DISCONTINUED | OUTPATIENT
Start: 2025-09-03 | End: 2025-09-03 | Stop reason: HOSPADM

## 2025-09-03 RX ADMIN — SODIUM CHLORIDE: 9 INJECTION, SOLUTION INTRAVENOUS at 10:20

## 2025-09-03 RX ADMIN — LIDOCAINE HYDROCHLORIDE 60 MG: 20 INJECTION, SOLUTION EPIDURAL; INFILTRATION; INTRACAUDAL; PERINEURAL at 10:24

## 2025-09-03 ASSESSMENT — PAIN SCALES - GENERAL
PAINLEVEL_OUTOF10: 0
PAINLEVEL_OUTOF10: 0

## 2025-09-03 ASSESSMENT — PAIN - FUNCTIONAL ASSESSMENT: PAIN_FUNCTIONAL_ASSESSMENT: 0-10

## (undated) DEVICE — GLOVE ORTHO 8   MSG9480

## (undated) DEVICE — 4-PORT MANIFOLD: Brand: NEPTUNE 2

## (undated) DEVICE — 4.5 MM INCISOR PLUS STRAIGHT                                    BLADES, POWER/EP-1, VIOLET, PACKAGED                                    6 PER BOX, STERILE

## (undated) DEVICE — GOWN SURG XL 56.5 IN AAMI LEVEL 3 ORBIS

## (undated) DEVICE — DISPOSABLE TOURNIQUET CUFF SINGLE BLADDER, DUAL PORT AND QUICK CONNECT CONNECTOR: Brand: COLOR CUFF

## (undated) DEVICE — TUBING PMP IRRIG GOFLO

## (undated) DEVICE — SOLUTION IRRIG 3000ML LAC RINGERS ARTHROMTC PLAS CONT

## (undated) DEVICE — GARMENT,MEDLINE,DVT,INT,CALF,MED, GEN2: Brand: MEDLINE

## (undated) DEVICE — APPLICATOR MEDICATED 26 CC SOLUTION HI LT ORNG CHLORAPREP

## (undated) DEVICE — SUTURE NONABSORBABLE MONOFILAMENT 4-0 PS-2 18 IN BLK ETHILON 1667G

## (undated) DEVICE — ARTHROSCOPY - RICHMOND: Brand: MEDLINE INDUSTRIES, INC.

## (undated) DEVICE — SPONGE GZ W4XL4IN COT 12 PLY TYP VII WVN C FLD DSGN STERILE